# Patient Record
Sex: FEMALE | Race: BLACK OR AFRICAN AMERICAN | NOT HISPANIC OR LATINO | Employment: FULL TIME | URBAN - METROPOLITAN AREA
[De-identification: names, ages, dates, MRNs, and addresses within clinical notes are randomized per-mention and may not be internally consistent; named-entity substitution may affect disease eponyms.]

---

## 2023-03-31 PROBLEM — S13.9XXA CERVICAL SPRAIN: Status: ACTIVE | Noted: 2023-03-31

## 2023-03-31 PROBLEM — N76.1 CHRONIC VAGINITIS: Status: ACTIVE | Noted: 2023-03-31

## 2023-03-31 PROBLEM — R92.8 ABNORMAL MAMMOGRAM: Status: ACTIVE | Noted: 2023-03-31

## 2023-03-31 PROBLEM — N81.89 PELVIC FLOOR WEAKNESS: Status: ACTIVE | Noted: 2023-03-31

## 2023-03-31 PROBLEM — G44.309 POST-TRAUMATIC HEADACHE: Status: ACTIVE | Noted: 2023-03-31

## 2023-03-31 PROBLEM — R20.2 RIGHT HAND PARESTHESIA: Status: ACTIVE | Noted: 2023-03-31

## 2023-03-31 PROBLEM — N95.1 PERIMENOPAUSAL: Status: ACTIVE | Noted: 2023-03-31

## 2023-03-31 PROBLEM — N39.3 FEMALE STRESS INCONTINENCE: Status: ACTIVE | Noted: 2023-03-31

## 2023-03-31 PROBLEM — N95.0 POSTMENOPAUSAL BLEEDING: Status: ACTIVE | Noted: 2023-03-31

## 2023-03-31 PROBLEM — E78.00 HYPERCHOLESTEROLEMIA: Status: ACTIVE | Noted: 2023-03-31

## 2023-03-31 PROBLEM — E55.9 VITAMIN D DEFICIENCY: Status: ACTIVE | Noted: 2023-03-31

## 2023-03-31 PROBLEM — B37.31 YEAST INFECTION OF THE VAGINA: Status: ACTIVE | Noted: 2023-03-31

## 2023-03-31 PROBLEM — M54.9 BACK PAIN: Status: ACTIVE | Noted: 2023-03-31

## 2023-03-31 PROBLEM — F43.20 ADJUSTMENT DISORDER: Status: ACTIVE | Noted: 2023-03-31

## 2023-03-31 PROBLEM — R51.9 HEADACHE: Status: ACTIVE | Noted: 2023-03-31

## 2023-03-31 PROBLEM — L65.9 ALOPECIA: Status: ACTIVE | Noted: 2023-03-31

## 2023-03-31 PROBLEM — N92.6 IRREGULAR MENSTRUAL BLEEDING: Status: ACTIVE | Noted: 2023-03-31
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
## 2023-03-31 PROBLEM — D64.9 ANEMIA: Status: ACTIVE | Noted: 2023-03-31

## 2023-03-31 PROBLEM — I73.9 CLAUDICATION OF UPPER EXTREMITY (CMS-HCC): Status: ACTIVE | Noted: 2023-03-31

## 2023-03-31 PROBLEM — M79.601 PAIN OF RIGHT UPPER EXTREMITY: Status: ACTIVE | Noted: 2023-03-31

## 2023-03-31 PROBLEM — G47.00 INSOMNIA: Status: ACTIVE | Noted: 2023-03-31

## 2023-03-31 PROBLEM — B96.89 BACTERIAL VAGINOSIS: Status: ACTIVE | Noted: 2023-03-31

## 2023-03-31 PROBLEM — E11.9 TYPE 2 DIABETES MELLITUS (MULTI): Status: ACTIVE | Noted: 2023-03-31

## 2023-03-31 PROBLEM — E11.9 DIABETES MELLITUS (MULTI): Status: ACTIVE | Noted: 2023-03-31

## 2023-03-31 PROBLEM — M54.2 NECK PAIN: Status: ACTIVE | Noted: 2023-03-31

## 2023-03-31 PROBLEM — E86.0 DEHYDRATION: Status: ACTIVE | Noted: 2023-03-31

## 2023-03-31 PROBLEM — N76.0 BACTERIAL VAGINOSIS: Status: ACTIVE | Noted: 2023-03-31

## 2023-03-31 PROBLEM — I10 HYPERTENSION: Status: ACTIVE | Noted: 2023-03-31

## 2023-03-31 PROBLEM — N39.41 URGE INCONTINENCE OF URINE: Status: ACTIVE | Noted: 2023-03-31

## 2023-03-31 RX ORDER — CLOTRIMAZOLE AND BETAMETHASONE DIPROPIONATE 10; .64 MG/G; MG/G
CREAM TOPICAL 2 TIMES DAILY
COMMUNITY
Start: 2021-11-02

## 2023-04-03 ENCOUNTER — OFFICE VISIT (OUTPATIENT)
Dept: PRIMARY CARE | Facility: CLINIC | Age: 58
End: 2023-04-03
Payer: COMMERCIAL

## 2023-04-03 VITALS
HEART RATE: 70 BPM | BODY MASS INDEX: 33.31 KG/M2 | DIASTOLIC BLOOD PRESSURE: 89 MMHG | TEMPERATURE: 97.2 F | SYSTOLIC BLOOD PRESSURE: 160 MMHG | WEIGHT: 188 LBS | HEIGHT: 63 IN

## 2023-04-03 DIAGNOSIS — M79.89 LEG SWELLING: ICD-10-CM

## 2023-04-03 DIAGNOSIS — E11.9 TYPE 2 DIABETES MELLITUS WITHOUT COMPLICATION, UNSPECIFIED WHETHER LONG TERM INSULIN USE (MULTI): Primary | ICD-10-CM

## 2023-04-03 DIAGNOSIS — M25.512 ACUTE PAIN OF LEFT SHOULDER: ICD-10-CM

## 2023-04-03 LAB
HBA1C MFR BLD: >14 % (ref 4.2–6.5)
POC FINGERSTICK BLOOD GLUCOSE: 339 MG/DL (ref 70–100)

## 2023-04-03 PROCEDURE — 80061 LIPID PANEL: CPT

## 2023-04-03 PROCEDURE — 85025 COMPLETE CBC W/AUTO DIFF WBC: CPT

## 2023-04-03 PROCEDURE — 3046F HEMOGLOBIN A1C LEVEL >9.0%: CPT | Performed by: FAMILY MEDICINE

## 2023-04-03 PROCEDURE — 80053 COMPREHEN METABOLIC PANEL: CPT

## 2023-04-03 PROCEDURE — 84443 ASSAY THYROID STIM HORMONE: CPT

## 2023-04-03 PROCEDURE — 82962 GLUCOSE BLOOD TEST: CPT | Performed by: FAMILY MEDICINE

## 2023-04-03 PROCEDURE — 1036F TOBACCO NON-USER: CPT | Performed by: FAMILY MEDICINE

## 2023-04-03 PROCEDURE — 3077F SYST BP >= 140 MM HG: CPT | Performed by: FAMILY MEDICINE

## 2023-04-03 PROCEDURE — 3079F DIAST BP 80-89 MM HG: CPT | Performed by: FAMILY MEDICINE

## 2023-04-03 PROCEDURE — 36415 COLL VENOUS BLD VENIPUNCTURE: CPT | Performed by: FAMILY MEDICINE

## 2023-04-03 PROCEDURE — 83036 HEMOGLOBIN GLYCOSYLATED A1C: CPT | Performed by: FAMILY MEDICINE

## 2023-04-03 PROCEDURE — 99215 OFFICE O/P EST HI 40 MIN: CPT | Performed by: FAMILY MEDICINE

## 2023-04-03 RX ORDER — FLASH GLUCOSE SCANNING READER
EACH MISCELLANEOUS
Qty: 1 EACH | Refills: 0 | Status: SHIPPED | OUTPATIENT
Start: 2023-04-03 | End: 2023-04-25 | Stop reason: SDUPTHER

## 2023-04-03 RX ORDER — METFORMIN HYDROCHLORIDE 500 MG/1
500 TABLET ORAL
Qty: 60 TABLET | Refills: 11 | Status: SHIPPED | OUTPATIENT
Start: 2023-04-03 | End: 2023-04-04 | Stop reason: SDUPTHER

## 2023-04-03 RX ORDER — INSULIN GLARGINE 100 [IU]/ML
20 INJECTION, SOLUTION SUBCUTANEOUS EVERY MORNING
Status: SHIPPED | OUTPATIENT
Start: 2023-04-04

## 2023-04-03 RX ORDER — FLASH GLUCOSE SENSOR
KIT MISCELLANEOUS
Qty: 1 EACH | Refills: 0 | Status: SHIPPED | OUTPATIENT
Start: 2023-04-03 | End: 2023-04-25 | Stop reason: SDUPTHER

## 2023-04-03 NOTE — PROGRESS NOTES
This is a 58-year-old female who is seeing me for the first time    She came complaining of left shoulder pain and right leg swelling    I reviewed her old chart and there was a diagnosis of diabetes but in her intake sheet she never mentioned anything about diabetes    And when I checked her blood sugar fingerstick it was in the 300s    Therefore I ordered C-peptide among other routine lab work    By the way she never had a lipid panel since 2016    Also she has never seen a ophthalmologist for her diabetes    I tried my best to educate on diabetes and prevention of complications    I started her on Basaglar 20 units and also metformin    Referred her to the endocrinologist    Some years ago she has had the sleep procedure for weight management    I informed her she needs to give up drinking pop and beer and get into the rhythm of changing her lifestyle    For her right leg swelling I ordered a stat duplex venous scan    And for her left shoulder pain referred her to the shoulder orthopedic    Also I referred her to the ophthalmologist    Wrote her a prescription for freestyle susan the glucose meter    Advised her to check her sugars and write it down and see me back tomorrow    Also it showed her how to use the Basaglar pen and advised her to  a prescription for metformin twice a day from the pharmacy

## 2023-04-03 NOTE — PATIENT INSTRUCTIONS
1.  Every day sleep  at night or rest ( some days we are unable to sleep )around the same time without the TV-this is important habit to reduce dementia and aging prematurely    2.  Eat 3 cups of green leafy vegetables daily include at least 1 fruit.,  Reduce animal protein consumption-/ also  Starch  consumption  --this will help to lose weight avoid illnesses such as dementia high blood pressure cancer.,  Diabetes    3.  Exercise including aerobics as well as resistant exercise for at least 45 minutes a day for 5 days this will also help to reduce premature aging     4.  Your sugar is extremely high as you hurt it in the 300 normally the fasting blood sugar should be around 100 therefore I have to start you on the long-acting insulin call the Basaglar for which I will give you samples from the office you have to take 20 units daily and I will show you how to do that along with that is metformin 500 twice a day and please follow the 3 objectives that I have relayed that is changing lifestyle  Please do not touch pop or drink beer    And see me tomorrow    I have ordered the freestyle susan which is system to check your sugar without pricking your fingers and I hope this will be covered on your insurance if not let us know    I also have referred you to the endocrinologist      The reason you were here today is complaining of right leg pain for which I ordered the ultrasound of your right leg to rule out clot that can break away and go to the lung so therefore you need to have the ultrasound done today regarding the left shoulder pain I have placed a referral to see the orthopedic doctor    Today before you leave we will also draw your blood work to check your cholesterol check your kidneys liver thyroid test    As you know uncontrolled diabetes can lead you to have strokes heart attacks diabetic coma blindness kidney failure damage to your nerves that can lead you to have burning pain in your feet.,  Cut off your  blood supply to your legs arms that will result in gangrene of your arms or legs so we need to take charge and control of your sugar and the only person who can do is you with our guidance    I will also place a referral for you to see the eye doctor because eyesight can get infected and lead to blindness    So that will be 4 referrals today

## 2023-04-04 ENCOUNTER — APPOINTMENT (OUTPATIENT)
Dept: PRIMARY CARE | Facility: CLINIC | Age: 58
End: 2023-04-04
Payer: COMMERCIAL

## 2023-04-04 DIAGNOSIS — E11.9 TYPE 2 DIABETES MELLITUS WITHOUT COMPLICATION, UNSPECIFIED WHETHER LONG TERM INSULIN USE (MULTI): ICD-10-CM

## 2023-04-04 LAB
ALANINE AMINOTRANSFERASE (SGPT) (U/L) IN SER/PLAS: 40 U/L (ref 7–45)
ALBUMIN (G/DL) IN SER/PLAS: 3.8 G/DL (ref 3.4–5)
ALKALINE PHOSPHATASE (U/L) IN SER/PLAS: 116 U/L (ref 33–110)
ANION GAP IN SER/PLAS: 14 MMOL/L (ref 10–20)
ASPARTATE AMINOTRANSFERASE (SGOT) (U/L) IN SER/PLAS: 20 U/L (ref 9–39)
BASOPHILS (10*3/UL) IN BLOOD BY AUTOMATED COUNT: 0.02 X10E9/L (ref 0–0.1)
BASOPHILS/100 LEUKOCYTES IN BLOOD BY AUTOMATED COUNT: 0.4 % (ref 0–2)
BILIRUBIN TOTAL (MG/DL) IN SER/PLAS: 0.6 MG/DL (ref 0–1.2)
CALCIUM (MG/DL) IN SER/PLAS: 9 MG/DL (ref 8.6–10.6)
CARBON DIOXIDE, TOTAL (MMOL/L) IN SER/PLAS: 28 MMOL/L (ref 21–32)
CHLORIDE (MMOL/L) IN SER/PLAS: 100 MMOL/L (ref 98–107)
CHOLESTEROL (MG/DL) IN SER/PLAS: 227 MG/DL (ref 0–199)
CHOLESTEROL IN HDL (MG/DL) IN SER/PLAS: 45.3 MG/DL
CHOLESTEROL/HDL RATIO: 5
CREATININE (MG/DL) IN SER/PLAS: 0.81 MG/DL (ref 0.5–1.05)
EOSINOPHILS (10*3/UL) IN BLOOD BY AUTOMATED COUNT: 0.13 X10E9/L (ref 0–0.7)
EOSINOPHILS/100 LEUKOCYTES IN BLOOD BY AUTOMATED COUNT: 2.7 % (ref 0–6)
ERYTHROCYTE DISTRIBUTION WIDTH (RATIO) BY AUTOMATED COUNT: 13.8 % (ref 11.5–14.5)
ERYTHROCYTE MEAN CORPUSCULAR HEMOGLOBIN CONCENTRATION (G/DL) BY AUTOMATED: 31.5 G/DL (ref 32–36)
ERYTHROCYTE MEAN CORPUSCULAR VOLUME (FL) BY AUTOMATED COUNT: 85 FL (ref 80–100)
ERYTHROCYTES (10*6/UL) IN BLOOD BY AUTOMATED COUNT: 4.03 X10E12/L (ref 4–5.2)
GFR FEMALE: 85 ML/MIN/1.73M2
GLUCOSE (MG/DL) IN SER/PLAS: 364 MG/DL (ref 74–99)
HEMATOCRIT (%) IN BLOOD BY AUTOMATED COUNT: 34.3 % (ref 36–46)
HEMOGLOBIN (G/DL) IN BLOOD: 10.8 G/DL (ref 12–16)
IMMATURE GRANULOCYTES/100 LEUKOCYTES IN BLOOD BY AUTOMATED COUNT: 0.2 % (ref 0–0.9)
LDL: 132 MG/DL (ref 0–99)
LEUKOCYTES (10*3/UL) IN BLOOD BY AUTOMATED COUNT: 4.8 X10E9/L (ref 4.4–11.3)
LYMPHOCYTES (10*3/UL) IN BLOOD BY AUTOMATED COUNT: 1.51 X10E9/L (ref 1.2–4.8)
LYMPHOCYTES/100 LEUKOCYTES IN BLOOD BY AUTOMATED COUNT: 31.2 % (ref 13–44)
MONOCYTES (10*3/UL) IN BLOOD BY AUTOMATED COUNT: 0.28 X10E9/L (ref 0.1–1)
MONOCYTES/100 LEUKOCYTES IN BLOOD BY AUTOMATED COUNT: 5.8 % (ref 2–10)
NEUTROPHILS (10*3/UL) IN BLOOD BY AUTOMATED COUNT: 2.89 X10E9/L (ref 1.2–7.7)
NEUTROPHILS/100 LEUKOCYTES IN BLOOD BY AUTOMATED COUNT: 59.7 % (ref 40–80)
NON HDL CHOLESTEROL: 182 MG/DL
NRBC (PER 100 WBCS) BY AUTOMATED COUNT: 0 /100 WBC (ref 0–0)
PLATELETS (10*3/UL) IN BLOOD AUTOMATED COUNT: 172 X10E9/L (ref 150–450)
POTASSIUM (MMOL/L) IN SER/PLAS: 3.6 MMOL/L (ref 3.5–5.3)
PROTEIN TOTAL: 6.8 G/DL (ref 6.4–8.2)
SODIUM (MMOL/L) IN SER/PLAS: 138 MMOL/L (ref 136–145)
THYROTROPIN (MIU/L) IN SER/PLAS BY DETECTION LIMIT <= 0.05 MIU/L: 1.24 MIU/L (ref 0.44–3.98)
TRIGLYCERIDE (MG/DL) IN SER/PLAS: 251 MG/DL (ref 0–149)
UREA NITROGEN (MG/DL) IN SER/PLAS: 6 MG/DL (ref 6–23)
VLDL: 50 MG/DL (ref 0–40)

## 2023-04-04 RX ORDER — METFORMIN HYDROCHLORIDE 500 MG/1
500 TABLET ORAL
Qty: 180 TABLET | Refills: 1 | Status: SHIPPED | OUTPATIENT
Start: 2023-04-04 | End: 2023-04-06

## 2023-04-06 ENCOUNTER — OFFICE VISIT (OUTPATIENT)
Dept: PRIMARY CARE | Facility: CLINIC | Age: 58
End: 2023-04-06
Payer: COMMERCIAL

## 2023-04-06 VITALS
HEIGHT: 63 IN | HEART RATE: 79 BPM | TEMPERATURE: 97.2 F | BODY MASS INDEX: 32.96 KG/M2 | SYSTOLIC BLOOD PRESSURE: 154 MMHG | DIASTOLIC BLOOD PRESSURE: 80 MMHG | WEIGHT: 186 LBS

## 2023-04-06 DIAGNOSIS — Z12.39 ENCOUNTER FOR SCREENING BREAST EXAMINATION: ICD-10-CM

## 2023-04-06 DIAGNOSIS — Z12.11 ENCOUNTER FOR SCREENING FOR MALIGNANT NEOPLASM OF COLON: ICD-10-CM

## 2023-04-06 DIAGNOSIS — I10 HYPERTENSION, UNSPECIFIED TYPE: ICD-10-CM

## 2023-04-06 DIAGNOSIS — E78.9 LIPID DISORDER: ICD-10-CM

## 2023-04-06 DIAGNOSIS — E11.65 TYPE 2 DIABETES MELLITUS WITH HYPERGLYCEMIA, UNSPECIFIED WHETHER LONG TERM INSULIN USE (MULTI): ICD-10-CM

## 2023-04-06 DIAGNOSIS — E11.65 TYPE 2 DIABETES MELLITUS WITH HYPERGLYCEMIA, UNSPECIFIED WHETHER LONG TERM INSULIN USE (MULTI): Primary | ICD-10-CM

## 2023-04-06 DIAGNOSIS — Z63.4 BEREAVEMENT: ICD-10-CM

## 2023-04-06 PROCEDURE — 3046F HEMOGLOBIN A1C LEVEL >9.0%: CPT | Performed by: FAMILY MEDICINE

## 2023-04-06 PROCEDURE — 3079F DIAST BP 80-89 MM HG: CPT | Performed by: FAMILY MEDICINE

## 2023-04-06 PROCEDURE — 4010F ACE/ARB THERAPY RXD/TAKEN: CPT | Performed by: FAMILY MEDICINE

## 2023-04-06 PROCEDURE — 3077F SYST BP >= 140 MM HG: CPT | Performed by: FAMILY MEDICINE

## 2023-04-06 PROCEDURE — 1036F TOBACCO NON-USER: CPT | Performed by: FAMILY MEDICINE

## 2023-04-06 PROCEDURE — 99214 OFFICE O/P EST MOD 30 MIN: CPT | Performed by: FAMILY MEDICINE

## 2023-04-06 RX ORDER — INSULIN GLARGINE 100 [IU]/ML
20 INJECTION, SOLUTION SUBCUTANEOUS NIGHTLY
Qty: 3 ML | Refills: 12 | Status: SHIPPED | OUTPATIENT
Start: 2023-04-06 | End: 2023-07-06 | Stop reason: WASHOUT

## 2023-04-06 RX ORDER — ATORVASTATIN CALCIUM 20 MG/1
20 TABLET, FILM COATED ORAL DAILY
Qty: 90 TABLET | Refills: 1 | Status: SHIPPED | OUTPATIENT
Start: 2023-04-06 | End: 2024-01-29 | Stop reason: ALTCHOICE

## 2023-04-06 RX ORDER — METFORMIN HYDROCHLORIDE 1000 MG/1
1000 TABLET ORAL
Qty: 180 TABLET | Refills: 1 | Status: SHIPPED | OUTPATIENT
Start: 2023-04-06 | End: 2023-07-06 | Stop reason: WASHOUT

## 2023-04-06 RX ORDER — METFORMIN HYDROCHLORIDE 1000 MG/1
1000 TABLET ORAL
Qty: 60 TABLET | Refills: 11 | Status: SHIPPED | OUTPATIENT
Start: 2023-04-06 | End: 2023-04-06 | Stop reason: SDUPTHER

## 2023-04-06 RX ORDER — ATORVASTATIN CALCIUM 20 MG/1
20 TABLET, FILM COATED ORAL DAILY
Qty: 30 TABLET | Refills: 5 | Status: SHIPPED | OUTPATIENT
Start: 2023-04-06 | End: 2023-04-06 | Stop reason: SDUPTHER

## 2023-04-06 RX ORDER — LOSARTAN POTASSIUM 50 MG/1
50 TABLET ORAL DAILY
Qty: 90 TABLET | Refills: 1 | Status: SHIPPED | OUTPATIENT
Start: 2023-04-06 | End: 2023-07-06 | Stop reason: SDUPTHER

## 2023-04-06 RX ORDER — LOSARTAN POTASSIUM 50 MG/1
50 TABLET ORAL DAILY
Qty: 30 TABLET | Refills: 11 | Status: SHIPPED | OUTPATIENT
Start: 2023-04-06 | End: 2023-04-06 | Stop reason: SDUPTHER

## 2023-04-06 SDOH — SOCIAL STABILITY - SOCIAL INSECURITY: DISSAPEARANCE AND DEATH OF FAMILY MEMBER: Z63.4

## 2023-04-06 NOTE — PATIENT INSTRUCTIONS
Since your blood sugar is still high I would like you to take 1000 mg of metformin twice a day okay please listen.  Metformin is a great medicine okay to help you to lose weight and also it reduces the aging process    Your cholesterol the bad cholesterol was 136 and also your liver count was high that shows that you are collecting fat in the liver for which I will order ultrasound of the liver and the gallbladder and start you on cholesterol medicine as well    All these medicines that we are speaking of currently will come off on his once I see that the sugar is getting better and you are losing weight    Are you following the 3 rules that I asked you to do    I hear that you are still working on the sleep    3 cups of green leafy vegetables could be anything that is leafy and green that can be kale broccoli and asparagus spinach Cabbage--all what I am asking you to do is measure 3 cups of green leafy vegetable it can be salad leaves anything that you come across and I wanted to make sure you eat 3 cups of green leafy vegetable no more than 1 fruit a day and ideally it should be tart food fruit rather than the sweet like a green apple.,  You can have animal protein but to a lesser degree     kim and sausage eggs cheese makes cholesterol in body so we will reduce that      Now lets talk about exercise--- please start counting your steps 10,000 a day should be your goal, and make sure that you had on to the gym at least 5 days a week that you will get on any of the machines and also start doing resistant exercise this will be the best medicine to fight to emotions, lose weight, and get rid of diabetes forever    So continue with the Basaglar at the same dose but I do not want  increase the insulin because it will put on more weight on you therefore increase the metformin to thousand twice a day        The other medicine that I have to start you on is to lower the blood pressure okay and it will be losartan  lower your cholesterol it will be Lipitor and increase the metformin to thousand twice a day continue the Basaglar at 20 units    But the best medicine is to work on your weight and the 3 cups of green leafy vegetables and if you diligently do it discipline yourself to do it the exercise the nutrition you will come off of all the medicine    Today also I am going to refer you to the psychologist    Since your red blood count was low I am also going to get a colonoscopy done but after age 50 it is a routine for you to have a colonoscopy done

## 2023-04-06 NOTE — PROGRESS NOTES
This is a 58-year-old female patient who is here for follow-up for diabetes    Again we had a long chat    I found out that her oldest child 32-year-old girl  2021    She is still trying to recover from it    She has 3 sons 1 living in [place] the child in [place] the other 1 in [place]        I feel managing her medical problems is hindered by her emotional situation losing her daughter    I have referred her to bereavement counseling    I will increase the metformin to thousand twice a day    Also continue the Basaglar at 20 units    Advised her to check her sugars before each meal she has got the freestyle susan    Started on losartan for her blood pressure and Lipitor for her cholesterol    Also referred her to get the colonoscopy and mammogram    Advised her to come back in 2 weeks with her sugar values    Swelling of her leg is better and her left shoulder pain is better

## 2023-04-12 DIAGNOSIS — E11.65 TYPE 2 DIABETES MELLITUS WITH HYPERGLYCEMIA, UNSPECIFIED WHETHER LONG TERM INSULIN USE (MULTI): Primary | ICD-10-CM

## 2023-04-12 RX ORDER — PEN NEEDLE, DIABETIC 29 G X1/2"
NEEDLE, DISPOSABLE MISCELLANEOUS
Qty: 100 EACH | Refills: 11 | Status: SHIPPED | OUTPATIENT
Start: 2023-04-12 | End: 2023-04-13 | Stop reason: SDUPTHER

## 2023-04-13 DIAGNOSIS — E11.65 TYPE 2 DIABETES MELLITUS WITH HYPERGLYCEMIA, UNSPECIFIED WHETHER LONG TERM INSULIN USE (MULTI): ICD-10-CM

## 2023-04-13 RX ORDER — PEN NEEDLE, DIABETIC 29 G X1/2"
NEEDLE, DISPOSABLE MISCELLANEOUS
Qty: 360 EACH | Refills: 1 | Status: SHIPPED | OUTPATIENT
Start: 2023-04-13 | End: 2024-01-29 | Stop reason: ALTCHOICE

## 2023-04-25 ENCOUNTER — OFFICE VISIT (OUTPATIENT)
Dept: PRIMARY CARE | Facility: CLINIC | Age: 58
End: 2023-04-25
Payer: COMMERCIAL

## 2023-04-25 ENCOUNTER — APPOINTMENT (OUTPATIENT)
Dept: PRIMARY CARE | Facility: CLINIC | Age: 58
End: 2023-04-25
Payer: COMMERCIAL

## 2023-04-25 VITALS
SYSTOLIC BLOOD PRESSURE: 116 MMHG | DIASTOLIC BLOOD PRESSURE: 69 MMHG | BODY MASS INDEX: 31.18 KG/M2 | TEMPERATURE: 97.2 F | HEIGHT: 63 IN | WEIGHT: 176 LBS | HEART RATE: 84 BPM

## 2023-04-25 DIAGNOSIS — E11.9 TYPE 2 DIABETES MELLITUS WITHOUT COMPLICATION, UNSPECIFIED WHETHER LONG TERM INSULIN USE (MULTI): ICD-10-CM

## 2023-04-25 DIAGNOSIS — E78.9 LIPID DISORDER: Primary | ICD-10-CM

## 2023-04-25 DIAGNOSIS — I10 HYPERTENSION, UNSPECIFIED TYPE: ICD-10-CM

## 2023-04-25 LAB — POC FINGERSTICK BLOOD GLUCOSE: 159 MG/DL (ref 70–100)

## 2023-04-25 PROCEDURE — 3074F SYST BP LT 130 MM HG: CPT | Performed by: FAMILY MEDICINE

## 2023-04-25 PROCEDURE — 1036F TOBACCO NON-USER: CPT | Performed by: FAMILY MEDICINE

## 2023-04-25 PROCEDURE — 3078F DIAST BP <80 MM HG: CPT | Performed by: FAMILY MEDICINE

## 2023-04-25 PROCEDURE — 82962 GLUCOSE BLOOD TEST: CPT | Performed by: FAMILY MEDICINE

## 2023-04-25 PROCEDURE — 99214 OFFICE O/P EST MOD 30 MIN: CPT | Performed by: FAMILY MEDICINE

## 2023-04-25 PROCEDURE — 4010F ACE/ARB THERAPY RXD/TAKEN: CPT | Performed by: FAMILY MEDICINE

## 2023-04-25 PROCEDURE — 3046F HEMOGLOBIN A1C LEVEL >9.0%: CPT | Performed by: FAMILY MEDICINE

## 2023-04-25 RX ORDER — FLASH GLUCOSE SENSOR
KIT MISCELLANEOUS
Qty: 1 EACH | Refills: 0 | Status: SHIPPED | OUTPATIENT
Start: 2023-04-25 | End: 2024-01-29 | Stop reason: ALTCHOICE

## 2023-04-25 RX ORDER — FLASH GLUCOSE SCANNING READER
EACH MISCELLANEOUS
Qty: 1 EACH | Refills: 0 | Status: SHIPPED | OUTPATIENT
Start: 2023-04-25 | End: 2024-01-29 | Stop reason: ALTCHOICE

## 2023-04-25 NOTE — PATIENT INSTRUCTIONS
Metformin does the same function inside the cell as if you are exercising and eating less metformin is found in a few research has shown that metformin does slows down the aging process     Ctn same dose of Metformin twice a day but continue to check your sugar continue to eat 3 cups of green leafy vegetables please take 10,000 steps daily    Losartan that is given for the blood pressure is also protecting you from having kidney damage due to diabetes    You need to check your sugar every day before you eat or drink I will prescribe the freestyle susan the sensor has to be changed every 14 days    Since your blood pressure is good I will reduce the losartan to 25 okay    Follow-up in 1 month

## 2023-04-25 NOTE — PROGRESS NOTES
This is a 58-year-old female patient who is here for her follow-up for diabetes hypertension hyperlipidemia    She is having side effects with metformin she feels nauseous and feel faint .    I wanted to cut back on the metformin but her sugar was 150 she was postprandial over 4 hours will reduce the losartan to 25        She does not use any insulin and she has not got the freestyle susan sensors yet but she told me that she will go to the pharmacy immediately and check on that    It looks like she might not need any insulin but advised her to continue to check her sugar daily since her numbers are getting better I will ask her to come back in 3 months for her annual physical      She was asked to come back in 1 month for follow-up on diabetes

## 2023-05-26 ENCOUNTER — APPOINTMENT (OUTPATIENT)
Dept: PRIMARY CARE | Facility: CLINIC | Age: 58
End: 2023-05-26
Payer: COMMERCIAL

## 2023-07-03 DIAGNOSIS — E78.9 LIPID DISORDER: ICD-10-CM

## 2023-07-03 RX ORDER — ATORVASTATIN CALCIUM 20 MG/1
20 TABLET, FILM COATED ORAL DAILY
Qty: 90 TABLET | Refills: 1 | OUTPATIENT
Start: 2023-07-03 | End: 2023-12-30

## 2023-07-03 NOTE — TELEPHONE ENCOUNTER
She needs appointment.  When I saw her on April 25 advised her to come back in 1 month I do not see any future appointments I have to see her before I authorize the cholesterol medicine    I also have to check her diabetes status

## 2023-07-05 ENCOUNTER — APPOINTMENT (OUTPATIENT)
Dept: PRIMARY CARE | Facility: CLINIC | Age: 58
End: 2023-07-05
Payer: COMMERCIAL

## 2023-07-06 ENCOUNTER — OFFICE VISIT (OUTPATIENT)
Dept: PRIMARY CARE | Facility: CLINIC | Age: 58
End: 2023-07-06
Payer: COMMERCIAL

## 2023-07-06 VITALS
WEIGHT: 175 LBS | HEART RATE: 101 BPM | DIASTOLIC BLOOD PRESSURE: 69 MMHG | SYSTOLIC BLOOD PRESSURE: 110 MMHG | BODY MASS INDEX: 31.01 KG/M2 | TEMPERATURE: 97 F | HEIGHT: 63 IN

## 2023-07-06 DIAGNOSIS — E55.9 VITAMIN D DEFICIENCY: ICD-10-CM

## 2023-07-06 DIAGNOSIS — E11.9 TYPE 2 DIABETES MELLITUS WITHOUT COMPLICATION, UNSPECIFIED WHETHER LONG TERM INSULIN USE (MULTI): Primary | ICD-10-CM

## 2023-07-06 DIAGNOSIS — R74.8 ELEVATED ALKALINE PHOSPHATASE LEVEL: ICD-10-CM

## 2023-07-06 DIAGNOSIS — R79.89 ELEVATED LIVER FUNCTION TESTS: ICD-10-CM

## 2023-07-06 DIAGNOSIS — I10 HYPERTENSION, UNSPECIFIED TYPE: ICD-10-CM

## 2023-07-06 LAB
ALANINE AMINOTRANSFERASE (SGPT) (U/L) IN SER/PLAS: 31 U/L (ref 7–45)
ALBUMIN (G/DL) IN SER/PLAS: 4.3 G/DL (ref 3.4–5)
ALKALINE PHOSPHATASE (U/L) IN SER/PLAS: 132 U/L (ref 33–110)
ASPARTATE AMINOTRANSFERASE (SGOT) (U/L) IN SER/PLAS: 23 U/L (ref 9–39)
BILIRUBIN DIRECT (MG/DL) IN SER/PLAS: 0.1 MG/DL (ref 0–0.3)
BILIRUBIN TOTAL (MG/DL) IN SER/PLAS: 0.6 MG/DL (ref 0–1.2)
PROTEIN TOTAL: 7.8 G/DL (ref 6.4–8.2)

## 2023-07-06 PROCEDURE — 3074F SYST BP LT 130 MM HG: CPT | Performed by: FAMILY MEDICINE

## 2023-07-06 PROCEDURE — 3078F DIAST BP <80 MM HG: CPT | Performed by: FAMILY MEDICINE

## 2023-07-06 PROCEDURE — 1036F TOBACCO NON-USER: CPT | Performed by: FAMILY MEDICINE

## 2023-07-06 PROCEDURE — 84681 ASSAY OF C-PEPTIDE: CPT

## 2023-07-06 PROCEDURE — 80076 HEPATIC FUNCTION PANEL: CPT

## 2023-07-06 PROCEDURE — 99214 OFFICE O/P EST MOD 30 MIN: CPT | Performed by: FAMILY MEDICINE

## 2023-07-06 PROCEDURE — 3046F HEMOGLOBIN A1C LEVEL >9.0%: CPT | Performed by: FAMILY MEDICINE

## 2023-07-06 PROCEDURE — 4010F ACE/ARB THERAPY RXD/TAKEN: CPT | Performed by: FAMILY MEDICINE

## 2023-07-06 RX ORDER — METFORMIN HYDROCHLORIDE 500 MG/1
2000 TABLET, EXTENDED RELEASE ORAL
Qty: 120 TABLET | Refills: 11 | Status: SHIPPED | OUTPATIENT
Start: 2023-07-06 | End: 2024-01-29 | Stop reason: ALTCHOICE

## 2023-07-06 RX ORDER — LOSARTAN POTASSIUM 50 MG/1
50 TABLET ORAL DAILY
Qty: 90 TABLET | Refills: 1 | Status: SHIPPED | OUTPATIENT
Start: 2023-07-06 | End: 2024-01-29 | Stop reason: ALTCHOICE

## 2023-07-06 RX ORDER — TIRZEPATIDE 5 MG/.5ML
INJECTION, SOLUTION SUBCUTANEOUS
COMMUNITY
End: 2023-10-25 | Stop reason: ALTCHOICE

## 2023-07-06 NOTE — PROGRESS NOTES
This is a 58-year-old female who has diabetes hyperlipidemia hypertension    She is here for follow-up    She is seen the endocrinologist and is on Mounjaro will be moving up to 5 mg    Since she still has a problem tolerating the metformin regular strength I have prescribed metformin  mg up to 4 tablets a day advised her to take it with the Mounjaro she is off insulin    I informed one of the liver function test the alkaline phosphatase was high and I am suspecting fatty liver I will repeat a hepatic panel before I renew her Lipitor prescription    I will check a C-peptide today    Advised her to come back in 3 months    I encouraged her to walk and lose weight continue losartan

## 2023-07-06 NOTE — PATIENT INSTRUCTIONS
1.  Regarding diabetes you are on Mounjaro it will help you to get your appetite down and make you lose weight it is not an insulin but it is a promoter of insulin    You continue 2.5 mg for tomorrow and then increase it to 5 mg you will feel full and will not have a great appetite which will help you to lose weight and get your sugar under control    2.  Also I changed your metformin from regular 1000 that has a bad taste and difficult to tolerate to metformin extended release 500 mg and you need to take up to 4 tablets and that tinea then the regular metformin and it is easier to tolerate and hopefully it is covered on your insurance    3.  Please continue to walk more you walk better and that will be for life    4.  Your liver function test was high that is alkaline phosphatase that is the name of the liver function test that was high and that can be due to too much fat in the liver.  When I checked your blood in April your fat in the liver was very high and also the cholesterol was high I will check your liver function test as well today    I will wait for the liver function test to  renew the cholesterol medicine      Please come back in 3 months

## 2023-07-07 LAB — C PEPTIDE (NG/ML) IN SER/PLAS: 8.5 NG/ML (ref 0.7–3.9)

## 2023-10-25 ENCOUNTER — CLINICAL SUPPORT (OUTPATIENT)
Dept: ENDOCRINOLOGY | Facility: CLINIC | Age: 58
End: 2023-10-25
Payer: COMMERCIAL

## 2023-10-25 DIAGNOSIS — E11.649 TYPE 2 DIABETES MELLITUS WITH HYPOGLYCEMIA WITHOUT COMA, WITHOUT LONG-TERM CURRENT USE OF INSULIN (MULTI): Primary | ICD-10-CM

## 2023-10-25 LAB — POC HEMOGLOBIN A1C: 5.9 % (ref 4.2–6.5)

## 2023-10-25 PROCEDURE — 99211 OFF/OP EST MAY X REQ PHY/QHP: CPT | Performed by: PHARMACIST

## 2023-10-25 PROCEDURE — 83036 HEMOGLOBIN GLYCOSYLATED A1C: CPT | Performed by: PHARMACIST

## 2023-10-25 RX ORDER — TIRZEPATIDE 5 MG/.5ML
5 INJECTION, SOLUTION SUBCUTANEOUS
Qty: 2 ML | Refills: 11 | Status: SHIPPED | OUTPATIENT
Start: 2023-10-25

## 2023-10-25 NOTE — PROGRESS NOTES
Clinical Pharmacy Team met with Rere Emery regarding a consultation for diabetes management thanks to a referral from Shila Bolaños CNP. Below is a summary of our conversation and recommendations:    Recommendations:  Continue all DM medications as prescribed  2. Patient should restart using Elizabeth CGM to monitor glucose  ________________________________________________________________________      Allergies   Allergen Reactions    Penicillins Itching     Diabetes Pharmacotherapy:    Mounjaro 5 mg subcutaneous once every 10 days      Lab Review  Lab Results   Component Value Date    BILITOT 0.6 07/06/2023    CALCIUM 9.0 04/03/2023    CO2 28 04/03/2023     04/03/2023    CREATININE 0.81 04/03/2023    GLUCOSE 364 (H) 04/03/2023    ALKPHOS 132 (H) 07/06/2023    K 3.6 04/03/2023    PROT 7.8 07/06/2023     04/03/2023    AST 23 07/06/2023    ALT 31 07/06/2023    BUN 6 04/03/2023    ANIONGAP 14 04/03/2023    ALBUMIN 4.3 07/06/2023    GFRF 85 04/03/2023     Lab Results   Component Value Date    TRIG 251 (H) 04/03/2023    CHOL 227 (H) 04/03/2023    HDL 45.3 04/03/2023     Lab Results   Component Value Date    HGBA1C 5.9 10/25/2023    HGBA1C >14.0 (H) 04/03/2023     The 10-year ASCVD risk score (Consuelo DK, et al., 2019) is: 10.6%    Values used to calculate the score:      Age: 58 years      Sex: Female      Is Non- : Yes      Diabetic: Yes      Tobacco smoker: No      Systolic Blood Pressure: 110 mmHg      Is BP treated: Yes      HDL Cholesterol: 45.3 mg/dL      Total Cholesterol: 227 mg/dL      Monitoring     Patient was not able to wear a CGM in the past two weeks. She reports her mother recently passing away this month. Reports new diagnosis of cancer in August which progressed rapidly. She states she will start wearing her CGM. She denies experiencing symptoms associated with hypoglycemia.     Assessment/Plan     The patient reports today for a diabetes consultation. In office A1C  resulted with an A1C at goal. Previous CGM data suggested an estimated A1C at goal as well. Patient denies experiancing symptoms of hypoglycemia. At this time recommend no changes as she is meeting her glycemic goals. We discussed getting repeat yearly labs which she was agreeable to. She will follow up with endocrinology in January.     Of note her past lipid panel suggests values that may not be at goal. This may have been secondary to her uncontrolled DM at the time. Will order lipid panel to review at next endo visit.     Patient understands and was agreeable to these recommendations.     PATIENT EDUCATION/GOALS    Goals  Fasting B - 130 mg/dL  Postprandial BG: less than 180 mg/dL  A1c: less than 7%    Type of encounter: [ in person ]    Provided counseling on lifestyle modifications, medications, and self-monitoring. Patient has no additional questions at this time. Pharmacy to follow up as requested by care team. Please reach out with any questions. Thank you.       Maritza Castro, PharmD    Provider on site:  Shila Bolaños CNP  Continue all meds under the continuation of care with the referring provider and clinical pharmacy team.

## 2024-01-29 ENCOUNTER — LAB (OUTPATIENT)
Dept: LAB | Facility: LAB | Age: 59
End: 2024-01-29
Payer: COMMERCIAL

## 2024-01-29 ENCOUNTER — OFFICE VISIT (OUTPATIENT)
Dept: ENDOCRINOLOGY | Facility: CLINIC | Age: 59
End: 2024-01-29
Payer: COMMERCIAL

## 2024-01-29 VITALS
HEIGHT: 64 IN | HEART RATE: 73 BPM | BODY MASS INDEX: 26.98 KG/M2 | SYSTOLIC BLOOD PRESSURE: 120 MMHG | WEIGHT: 158 LBS | DIASTOLIC BLOOD PRESSURE: 73 MMHG

## 2024-01-29 DIAGNOSIS — E11.65 TYPE 2 DIABETES MELLITUS WITH HYPERGLYCEMIA, WITHOUT LONG-TERM CURRENT USE OF INSULIN (MULTI): Primary | ICD-10-CM

## 2024-01-29 DIAGNOSIS — E78.5 HYPERLIPIDEMIA, UNSPECIFIED HYPERLIPIDEMIA TYPE: ICD-10-CM

## 2024-01-29 DIAGNOSIS — E11.65 TYPE 2 DIABETES MELLITUS WITH HYPERGLYCEMIA, WITHOUT LONG-TERM CURRENT USE OF INSULIN (MULTI): ICD-10-CM

## 2024-01-29 LAB
ALBUMIN SERPL BCP-MCNC: 4.4 G/DL (ref 3.4–5)
ALP SERPL-CCNC: 66 U/L (ref 33–110)
ALT SERPL W P-5'-P-CCNC: 17 U/L (ref 7–45)
ANION GAP SERPL CALC-SCNC: 11 MMOL/L (ref 10–20)
AST SERPL W P-5'-P-CCNC: 17 U/L (ref 9–39)
BILIRUB SERPL-MCNC: 0.4 MG/DL (ref 0–1.2)
BUN SERPL-MCNC: 16 MG/DL (ref 6–23)
CALCIUM SERPL-MCNC: 10 MG/DL (ref 8.6–10.6)
CHLORIDE SERPL-SCNC: 103 MMOL/L (ref 98–107)
CHOLEST SERPL-MCNC: 222 MG/DL (ref 0–199)
CHOLESTEROL/HDL RATIO: 5.1
CO2 SERPL-SCNC: 30 MMOL/L (ref 21–32)
CREAT SERPL-MCNC: 1.05 MG/DL (ref 0.5–1.05)
CREAT UR-MCNC: 217.9 MG/DL (ref 20–320)
EGFRCR SERPLBLD CKD-EPI 2021: 62 ML/MIN/1.73M*2
GLUCOSE SERPL-MCNC: 90 MG/DL (ref 74–99)
HDLC SERPL-MCNC: 43.5 MG/DL
MICROALBUMIN UR-MCNC: 16.9 MG/L
MICROALBUMIN/CREAT UR: 7.8 UG/MG CREAT
NON-HDL CHOLESTEROL: 179 MG/DL (ref 0–149)
POC HEMOGLOBIN A1C: 6.3 % (ref 4.2–6.5)
POTASSIUM SERPL-SCNC: 3.9 MMOL/L (ref 3.5–5.3)
PROT SERPL-MCNC: 7.4 G/DL (ref 6.4–8.2)
SODIUM SERPL-SCNC: 140 MMOL/L (ref 136–145)

## 2024-01-29 PROCEDURE — 36415 COLL VENOUS BLD VENIPUNCTURE: CPT

## 2024-01-29 PROCEDURE — 83718 ASSAY OF LIPOPROTEIN: CPT

## 2024-01-29 PROCEDURE — 82570 ASSAY OF URINE CREATININE: CPT

## 2024-01-29 PROCEDURE — 99214 OFFICE O/P EST MOD 30 MIN: CPT | Performed by: NURSE PRACTITIONER

## 2024-01-29 PROCEDURE — 82043 UR ALBUMIN QUANTITATIVE: CPT

## 2024-01-29 PROCEDURE — 3074F SYST BP LT 130 MM HG: CPT | Performed by: NURSE PRACTITIONER

## 2024-01-29 PROCEDURE — 80053 COMPREHEN METABOLIC PANEL: CPT

## 2024-01-29 PROCEDURE — 82465 ASSAY BLD/SERUM CHOLESTEROL: CPT

## 2024-01-29 PROCEDURE — 83036 HEMOGLOBIN GLYCOSYLATED A1C: CPT | Performed by: NURSE PRACTITIONER

## 2024-01-29 PROCEDURE — 1036F TOBACCO NON-USER: CPT | Performed by: NURSE PRACTITIONER

## 2024-01-29 PROCEDURE — 3078F DIAST BP <80 MM HG: CPT | Performed by: NURSE PRACTITIONER

## 2024-01-29 NOTE — PROGRESS NOTES
"Roldan Emery is a 58 y.o. female presents today for a follow up of DM Type 2.  Initial diagnosis with diabetes was \"some years ago.\" The patient has a family history is her mother, maternal grandmother, sister.   Known complications include: HTN,   History of gastric sleeve and \"I did not do what I was supposed to do and the weight came back.\"       Last visit with me 6/2023  He was able to meet with PharmD once since our last visit   A1c 6.3% today.  Previous A1c 5.9% in 10/2023.     Since last visit, her mom passes away  Overall she is feeling well with mounjaro   She is not taking statin or ARB      Historical meds:   Basaglar - stopped when sugars at goal   Metformin 1000 mg twice daily - GI side effect, A1c controlled with GLP1    Current diabetes regimen is as follows:   Mounjaro 5 mg once weekly     Patient is using continuous glucose monitor - Elizabeth 3  The patient is currently checking the blood glucose as needed     Hypoglycemia frequency: 7% on but appears to be bad sensor    Hypoglycemia awareness: yes     Regarding symptoms of hyperglycemia, the patient is not experiencing any symptoms such as polyuria, polydipsia, nocturia or rapid weight loss or blurry vision. The patient comes into the office today without concerns.        ROS  General: no fever, chills or acute changes in weight in the last 6 months  Skin: no rashes, pruritis or dry skin  Cardiac: denies chest pain, heart palpitations or orthopnea  Pulmonary: denies wheezing, productive cough or exertional dyspnea      Objective    Physical Exam  Blood pressure 120/73, pulse 73, height 1.626 m (5' 4\"), weight 71.7 kg (158 lb), last menstrual period 03/15/2021.  General: not in acute distress, cooperative   Respiratory: normal respiratory effort  Musculoskeletal: normal gait       Current Outpatient Medications:     clotrimazole-betamethasone (Lotrisone) cream, twice a day. APPLY AND RUB IN A THIN FILM TO AFFECTED AREAS, Disp: , Rfl:    "  tirzepatide (Mounjaro) 5 mg/0.5 mL pen injector, Inject 5 mg under the skin 1 (one) time per week., Disp: 2 mL, Rfl: 11    Current Facility-Administered Medications:     insulin glargine (Lantus) pen 20 Units, 20 Units, subcutaneous, q AM, Ashley Larose MD    Assessment/Plan   Type 2 diabetes with hyperglycemia without long term use insulin:   Hyperlipidemia:   -A1c at goal.  Time in range is excellent.  Having 7% lows but appear she had a bad sensor.  Reinforced that she should not have any low blood sugars with Mounjaro and these were false lows due to a bad sensor.  Recommended follow-up with primary care and likely will transition after next visit.  Needs to repeat check labs.  Discussed need for statin if LDL is still high.  She is not taking atorvastatin at this time    Plan:   Continue Mounjaro 5 mg once weekly    Consider Dr. Cesar/Dr. Rosado both are at [facility] location.    Get labs    Follow up 1 year

## 2024-01-29 NOTE — PATIENT INSTRUCTIONS
Continue Mounjaro 5 mg once weekly      Consider Dr. Cesar/Dr. Rosado both are at [facility] location.      Get labs      Follow up 1 year

## 2024-02-06 DIAGNOSIS — E78.5 HYPERLIPIDEMIA, UNSPECIFIED HYPERLIPIDEMIA TYPE: Primary | ICD-10-CM

## 2024-02-06 RX ORDER — ROSUVASTATIN CALCIUM 10 MG/1
10 TABLET, COATED ORAL DAILY
Qty: 90 TABLET | Refills: 3 | Status: SHIPPED | OUTPATIENT
Start: 2024-02-06

## 2024-02-06 RX ORDER — ROSUVASTATIN CALCIUM 10 MG/1
TABLET, COATED ORAL EVERY 24 HOURS
COMMUNITY
End: 2024-02-06 | Stop reason: SDUPTHER

## 2024-02-08 ENCOUNTER — OFFICE VISIT (OUTPATIENT)
Dept: OBSTETRICS AND GYNECOLOGY | Facility: CLINIC | Age: 59
End: 2024-02-08
Payer: COMMERCIAL

## 2024-02-08 VITALS
SYSTOLIC BLOOD PRESSURE: 140 MMHG | DIASTOLIC BLOOD PRESSURE: 79 MMHG | BODY MASS INDEX: 27.49 KG/M2 | HEIGHT: 64 IN | WEIGHT: 161 LBS

## 2024-02-08 DIAGNOSIS — N89.8 VAGINAL DISCHARGE: Primary | ICD-10-CM

## 2024-02-08 PROCEDURE — 99214 OFFICE O/P EST MOD 30 MIN: CPT | Performed by: STUDENT IN AN ORGANIZED HEALTH CARE EDUCATION/TRAINING PROGRAM

## 2024-02-08 PROCEDURE — 1036F TOBACCO NON-USER: CPT | Performed by: STUDENT IN AN ORGANIZED HEALTH CARE EDUCATION/TRAINING PROGRAM

## 2024-02-08 PROCEDURE — 3078F DIAST BP <80 MM HG: CPT | Performed by: STUDENT IN AN ORGANIZED HEALTH CARE EDUCATION/TRAINING PROGRAM

## 2024-02-08 PROCEDURE — 87205 SMEAR GRAM STAIN: CPT

## 2024-02-08 PROCEDURE — 3077F SYST BP >= 140 MM HG: CPT | Performed by: STUDENT IN AN ORGANIZED HEALTH CARE EDUCATION/TRAINING PROGRAM

## 2024-02-08 PROCEDURE — 3061F NEG MICROALBUMINURIA REV: CPT | Performed by: STUDENT IN AN ORGANIZED HEALTH CARE EDUCATION/TRAINING PROGRAM

## 2024-02-08 ASSESSMENT — ENCOUNTER SYMPTOMS
EYES NEGATIVE: 0
NEUROLOGICAL NEGATIVE: 0
GASTROINTESTINAL NEGATIVE: 0
CONSTITUTIONAL NEGATIVE: 0
ALLERGIC/IMMUNOLOGIC NEGATIVE: 0
HEMATOLOGIC/LYMPHATIC NEGATIVE: 0
ENDOCRINE NEGATIVE: 0
RESPIRATORY NEGATIVE: 0
PSYCHIATRIC NEGATIVE: 0
CARDIOVASCULAR NEGATIVE: 0
MUSCULOSKELETAL NEGATIVE: 0

## 2024-02-08 ASSESSMENT — PAIN SCALES - GENERAL: PAINLEVEL: 0-NO PAIN

## 2024-02-08 NOTE — PROGRESS NOTES
Subjective   Rere Emery is a 58 y.o. female who complains of vaginal discharge/itch.    HPI:  Symptoms reported: vaginal discharge  Onset:  started after taking 4 doses of diflucan afte a dental work anwith abx and oral antifungals for hair loss from derm  Course: persistent  Progression: stayed the same    Helpful treatments: none  Unhelpful treatments tried: none    Objective   Physical Exam:   General Appearance: alert and oriented, in no acute distress  Abdomen: soft, non-tender; bowel sounds normal; no masses, no organomegaly  Pelvic Exam: external genitalia normal and grey white discahrge throuht vagina, cervix withoit lesion  Urine dipstick: not done.    Assessment/Plan   Diagnoses and all orders for this visit:  Vaginal discharge  -     Vaginitis Gram Stain For Bacterial Vaginosis + Yeast; Future    Follow up results and treat as indicated.

## 2024-02-10 LAB
CLUE CELLS VAG LPF-#/AREA: NORMAL /[LPF]
NUGENT SCORE: 2
YEAST VAG WET PREP-#/AREA: NORMAL

## 2024-02-15 ENCOUNTER — PATIENT MESSAGE (OUTPATIENT)
Dept: OBSTETRICS AND GYNECOLOGY | Facility: CLINIC | Age: 59
End: 2024-02-15
Payer: COMMERCIAL

## 2024-02-15 DIAGNOSIS — N89.8 VAGINAL DISCHARGE: Primary | ICD-10-CM

## 2024-02-19 RX ORDER — METRONIDAZOLE 500 MG/1
500 TABLET ORAL 2 TIMES DAILY
Qty: 14 TABLET | Refills: 0 | Status: SHIPPED | OUTPATIENT
Start: 2024-02-19 | End: 2024-02-20

## 2024-02-20 RX ORDER — METRONIDAZOLE 7.5 MG/G
GEL VAGINAL 2 TIMES DAILY
Qty: 70 G | Refills: 0 | Status: SHIPPED | OUTPATIENT
Start: 2024-02-20 | End: 2024-02-27

## 2024-03-06 ENCOUNTER — OFFICE VISIT (OUTPATIENT)
Dept: OBSTETRICS AND GYNECOLOGY | Facility: CLINIC | Age: 59
End: 2024-03-06
Payer: COMMERCIAL

## 2024-03-06 VITALS
BODY MASS INDEX: 28.68 KG/M2 | DIASTOLIC BLOOD PRESSURE: 92 MMHG | SYSTOLIC BLOOD PRESSURE: 155 MMHG | HEIGHT: 64 IN | WEIGHT: 168 LBS

## 2024-03-06 DIAGNOSIS — B37.31 VAGINAL YEAST INFECTION: Primary | ICD-10-CM

## 2024-03-06 DIAGNOSIS — N95.0 POST-MENOPAUSE BLEEDING: ICD-10-CM

## 2024-03-06 PROCEDURE — 99214 OFFICE O/P EST MOD 30 MIN: CPT | Performed by: STUDENT IN AN ORGANIZED HEALTH CARE EDUCATION/TRAINING PROGRAM

## 2024-03-06 PROCEDURE — 3077F SYST BP >= 140 MM HG: CPT | Performed by: STUDENT IN AN ORGANIZED HEALTH CARE EDUCATION/TRAINING PROGRAM

## 2024-03-06 PROCEDURE — 3061F NEG MICROALBUMINURIA REV: CPT | Performed by: STUDENT IN AN ORGANIZED HEALTH CARE EDUCATION/TRAINING PROGRAM

## 2024-03-06 PROCEDURE — 87102 FUNGUS ISOLATION CULTURE: CPT

## 2024-03-06 PROCEDURE — 87205 SMEAR GRAM STAIN: CPT

## 2024-03-06 PROCEDURE — 1036F TOBACCO NON-USER: CPT | Performed by: STUDENT IN AN ORGANIZED HEALTH CARE EDUCATION/TRAINING PROGRAM

## 2024-03-06 PROCEDURE — 3080F DIAST BP >= 90 MM HG: CPT | Performed by: STUDENT IN AN ORGANIZED HEALTH CARE EDUCATION/TRAINING PROGRAM

## 2024-03-06 RX ORDER — FLUCONAZOLE 150 MG/1
150 TABLET ORAL ONCE
Qty: 1 TABLET | Refills: 0 | Status: SHIPPED | OUTPATIENT
Start: 2024-03-06 | End: 2024-03-06

## 2024-03-06 ASSESSMENT — PAIN SCALES - GENERAL: PAINLEVEL: 0-NO PAIN

## 2024-03-06 ASSESSMENT — ENCOUNTER SYMPTOMS
ENDOCRINE NEGATIVE: 0
CARDIOVASCULAR NEGATIVE: 0
NEUROLOGICAL NEGATIVE: 0
EYES NEGATIVE: 0
ALLERGIC/IMMUNOLOGIC NEGATIVE: 0
HEMATOLOGIC/LYMPHATIC NEGATIVE: 0
CONSTITUTIONAL NEGATIVE: 0
RESPIRATORY NEGATIVE: 0
GASTROINTESTINAL NEGATIVE: 0
PSYCHIATRIC NEGATIVE: 0
MUSCULOSKELETAL NEGATIVE: 0

## 2024-03-06 NOTE — PROGRESS NOTES
Subjective   Patient ID: Rere Emery is a 58 y.o. female who presents for Vaginal Bleeding (Vaginal bleeding last pap 8/18/23 wnl HPV negative last mammogram 4/6/23 benign ).  Patient here for evaluation of postmenopausal vaginal bleeding in the setting of recent vaginitis.    Vaginal Bleeding        Review of Systems   Genitourinary:  Positive for vaginal bleeding.   All other systems reviewed and are negative.      Objective   Physical Exam  Vitals reviewed. Exam conducted with a chaperone present.   Constitutional:       General: She is not in acute distress.     Appearance: She is not ill-appearing.   Pulmonary:      Effort: Pulmonary effort is normal.   Genitourinary:     General: Normal vulva.      Exam position: Lithotomy position.      Vagina: No signs of injury and foreign body. Vaginal discharge present. No erythema, tenderness, bleeding, lesions or prolapsed vaginal walls.      Cervix: No discharge, friability, lesion, erythema or cervical bleeding.      Comments: Thick curdy large-volume white vaginal discharge throughout fornices  Neurological:      Mental Status: She is alert.         Assessment/Plan   Diagnoses and all orders for this visit:  Vaginal yeast infection  -     Fungal Culture/Smear  -     Vaginitis Gram Stain For Bacterial Vaginosis + Yeast  -     fluconazole (Diflucan) 150 mg tablet; Take 1 tablet (150 mg) by mouth 1 time for 1 dose.  Post-menopause bleeding  -     US PELVIS TRANSABDOMINAL WITH TRANSVAGINAL; Future    Patient here for evaluation of postmenopausal vaginal bleeding in the setting of recent vaginitis.  Patient's did have clinical evidence of vaginitis on previous visit but her swab was negative so she did not complete treatment.  She did however have some vaginal bleeding with applicator placement when she was using her Lotrisone.  Suspect that vaginal bleeding is due to trauma likely in the setting of postmenopausal vaginal atrophy nevertheless we will obtain pelvic  ultrasound and treat for clinical yeast infection.  Patient to follow-up in 1 month for symptom check will consider endometrial biopsy as indicated at that time.       Balwinder Hurley MD 03/06/24 4:45 PM

## 2024-03-07 LAB
CLUE CELLS VAG LPF-#/AREA: NORMAL /[LPF]
NUGENT SCORE: 3
YEAST VAG WET PREP-#/AREA: NORMAL

## 2024-03-13 ENCOUNTER — HOSPITAL ENCOUNTER (OUTPATIENT)
Dept: RADIOLOGY | Facility: HOSPITAL | Age: 59
Discharge: HOME | End: 2024-03-13
Payer: COMMERCIAL

## 2024-03-13 DIAGNOSIS — N95.0 POST-MENOPAUSE BLEEDING: ICD-10-CM

## 2024-03-13 PROCEDURE — 76856 US EXAM PELVIC COMPLETE: CPT

## 2024-03-22 ENCOUNTER — APPOINTMENT (OUTPATIENT)
Dept: OBSTETRICS AND GYNECOLOGY | Facility: CLINIC | Age: 59
End: 2024-03-22
Payer: COMMERCIAL

## 2024-03-25 LAB
FUNGUS SPEC CULT: NORMAL
FUNGUS SPEC FUNGUS STN: NORMAL

## 2024-04-03 ENCOUNTER — OFFICE VISIT (OUTPATIENT)
Dept: OBSTETRICS AND GYNECOLOGY | Facility: CLINIC | Age: 59
End: 2024-04-03
Payer: COMMERCIAL

## 2024-04-03 VITALS
WEIGHT: 172 LBS | BODY MASS INDEX: 29.37 KG/M2 | HEIGHT: 64 IN | SYSTOLIC BLOOD PRESSURE: 154 MMHG | DIASTOLIC BLOOD PRESSURE: 89 MMHG

## 2024-04-03 DIAGNOSIS — N95.0 POST-MENOPAUSE BLEEDING: Primary | ICD-10-CM

## 2024-04-03 PROCEDURE — 3079F DIAST BP 80-89 MM HG: CPT | Performed by: STUDENT IN AN ORGANIZED HEALTH CARE EDUCATION/TRAINING PROGRAM

## 2024-04-03 PROCEDURE — 3061F NEG MICROALBUMINURIA REV: CPT | Performed by: STUDENT IN AN ORGANIZED HEALTH CARE EDUCATION/TRAINING PROGRAM

## 2024-04-03 PROCEDURE — 3077F SYST BP >= 140 MM HG: CPT | Performed by: STUDENT IN AN ORGANIZED HEALTH CARE EDUCATION/TRAINING PROGRAM

## 2024-04-03 PROCEDURE — 99213 OFFICE O/P EST LOW 20 MIN: CPT | Performed by: STUDENT IN AN ORGANIZED HEALTH CARE EDUCATION/TRAINING PROGRAM

## 2024-04-03 ASSESSMENT — ENCOUNTER SYMPTOMS
ALLERGIC/IMMUNOLOGIC NEGATIVE: 0
ENDOCRINE NEGATIVE: 0
CARDIOVASCULAR NEGATIVE: 0
CONSTITUTIONAL NEGATIVE: 0
PSYCHIATRIC NEGATIVE: 0
GASTROINTESTINAL NEGATIVE: 0
EYES NEGATIVE: 0
HEMATOLOGIC/LYMPHATIC NEGATIVE: 0
MUSCULOSKELETAL NEGATIVE: 0
RESPIRATORY NEGATIVE: 0
NEUROLOGICAL NEGATIVE: 0

## 2024-04-03 ASSESSMENT — PAIN SCALES - GENERAL: PAINLEVEL: 0-NO PAIN

## 2024-04-03 NOTE — PROGRESS NOTES
Subjective   Patient ID: Rere Emery is a 59 y.o. female who presents for Follow-up (Follow up visit last pap 8/18/23 wnl HPV negative last mammogram 4/6/23 benign ).  Here for follow-up of postmenopausal vaginal bleeding yeast vaginitis and ultrasound results.  Patient with no acute complaints denies any additional postmenopausal vaginal bleeding and says that she now has minimal normal discharge.        Review of Systems   All other systems reviewed and are negative.      Objective   Physical Exam  Vitals reviewed.   Constitutional:       General: She is not in acute distress.     Appearance: She is not ill-appearing.   Pulmonary:      Effort: Pulmonary effort is normal.   Skin:     Coloration: Skin is not pale.   Neurological:      Mental Status: She is alert.         Assessment/Plan   Diagnoses and all orders for this visit:  Post-menopause bleeding    Patient here for follow-up.  Vaginitis symptoms have resolved patient denies any recurrent postmenopausal vaginal bleeding.  Transvaginal ultrasound reviewed.  See with 3 mm.  Patient does have a fibroid uterus.  Fluid in the endometrial canal cell threshold will be extremely low to biopsy patient in fact biopsy was offered today which patient declined.  Patient to call the office if she has any vaginal bleeding whatsoever and we will arrange for endometrial biopsy at that time.  Next annual visit should be in August.       Balwinder Hurley MD 04/03/24 10:39 AM   
Statement Selected

## 2024-04-11 ENCOUNTER — TELEPHONE (OUTPATIENT)
Dept: ENDOCRINOLOGY | Facility: CLINIC | Age: 59
End: 2024-04-11
Payer: COMMERCIAL

## 2024-04-11 DIAGNOSIS — E11.65 TYPE 2 DIABETES MELLITUS WITH HYPERGLYCEMIA, WITHOUT LONG-TERM CURRENT USE OF INSULIN (MULTI): Primary | ICD-10-CM

## 2024-04-11 RX ORDER — DEXTROSE 4 G
TABLET,CHEWABLE ORAL
Qty: 1 EACH | Refills: 0 | Status: SHIPPED | OUTPATIENT
Start: 2024-04-11

## 2024-04-11 RX ORDER — LANCETS 33 GAUGE
EACH MISCELLANEOUS
Qty: 100 EACH | Refills: 3 | Status: SHIPPED | OUTPATIENT
Start: 2024-04-11

## 2024-04-11 RX ORDER — BLOOD SUGAR DIAGNOSTIC
STRIP MISCELLANEOUS
Qty: 100 EACH | Refills: 3 | Status: SHIPPED | OUTPATIENT
Start: 2024-04-11

## 2024-04-11 NOTE — TELEPHONE ENCOUNTER
Received fax from [facility] pharmacy requesting a new rx for one touch ultra 2 kit. Do we need to send test strips and lancets too?

## 2024-04-11 NOTE — TELEPHONE ENCOUNTER
Orders Placed This Encounter      blood-glucose meter (OneTouch Ultra2 Meter) misc      lancets 33 gauge misc      OneTouch Ultra Test strip

## 2024-04-15 ENCOUNTER — APPOINTMENT (OUTPATIENT)
Dept: PRIMARY CARE | Facility: CLINIC | Age: 59
End: 2024-04-15
Payer: COMMERCIAL

## 2024-05-03 ENCOUNTER — APPOINTMENT (OUTPATIENT)
Dept: PRIMARY CARE | Facility: CLINIC | Age: 59
End: 2024-05-03
Payer: COMMERCIAL

## 2024-05-03 ENCOUNTER — OFFICE VISIT (OUTPATIENT)
Dept: OBSTETRICS AND GYNECOLOGY | Facility: CLINIC | Age: 59
End: 2024-05-03
Payer: COMMERCIAL

## 2024-05-03 VITALS
DIASTOLIC BLOOD PRESSURE: 73 MMHG | WEIGHT: 174 LBS | BODY MASS INDEX: 29.87 KG/M2 | SYSTOLIC BLOOD PRESSURE: 121 MMHG | RESPIRATION RATE: 16 BRPM

## 2024-05-03 DIAGNOSIS — N95.0 POST-MENOPAUSE BLEEDING: Primary | ICD-10-CM

## 2024-05-03 PROCEDURE — 3074F SYST BP LT 130 MM HG: CPT | Performed by: STUDENT IN AN ORGANIZED HEALTH CARE EDUCATION/TRAINING PROGRAM

## 2024-05-03 PROCEDURE — 88305 TISSUE EXAM BY PATHOLOGIST: CPT | Performed by: PATHOLOGY

## 2024-05-03 PROCEDURE — 99214 OFFICE O/P EST MOD 30 MIN: CPT | Performed by: STUDENT IN AN ORGANIZED HEALTH CARE EDUCATION/TRAINING PROGRAM

## 2024-05-03 PROCEDURE — 3078F DIAST BP <80 MM HG: CPT | Performed by: STUDENT IN AN ORGANIZED HEALTH CARE EDUCATION/TRAINING PROGRAM

## 2024-05-03 PROCEDURE — 88305 TISSUE EXAM BY PATHOLOGIST: CPT

## 2024-05-03 PROCEDURE — 3061F NEG MICROALBUMINURIA REV: CPT | Performed by: STUDENT IN AN ORGANIZED HEALTH CARE EDUCATION/TRAINING PROGRAM

## 2024-05-03 PROCEDURE — 58100 BIOPSY OF UTERUS LINING: CPT | Performed by: STUDENT IN AN ORGANIZED HEALTH CARE EDUCATION/TRAINING PROGRAM

## 2024-05-03 ASSESSMENT — ENCOUNTER SYMPTOMS
NEUROLOGICAL NEGATIVE: 0
PSYCHIATRIC NEGATIVE: 0
ENDOCRINE NEGATIVE: 0
ALLERGIC/IMMUNOLOGIC NEGATIVE: 0
CARDIOVASCULAR NEGATIVE: 0
GASTROINTESTINAL NEGATIVE: 0
HEMATOLOGIC/LYMPHATIC NEGATIVE: 0
EYES NEGATIVE: 0
CONSTITUTIONAL NEGATIVE: 0
RESPIRATORY NEGATIVE: 0
MUSCULOSKELETAL NEGATIVE: 0

## 2024-05-03 ASSESSMENT — PAIN SCALES - GENERAL: PAINLEVEL: 0-NO PAIN

## 2024-05-03 NOTE — PROGRESS NOTES
Subjective   Patient ID: Rere Emery is a 59 y.o. female who presents for Vaginal Bleeding (Pt C/O AUB x3 days currently. ).  Patient here for recurrent postmenopausal vaginal bleeding.  Patient says that she has been having bleeding for the last 3 days.  Patient is amenable to endometrial biopsy.    Vaginal Bleeding        Review of Systems   Genitourinary:  Positive for vaginal bleeding.   All other systems reviewed and are negative.      Objective   Physical Exam  Vitals reviewed. Exam conducted with a chaperone present.   Constitutional:       General: She is not in acute distress.     Appearance: She is not ill-appearing.   Pulmonary:      Effort: Pulmonary effort is normal.   Genitourinary:     General: Normal vulva.      Exam position: Lithotomy position.      Vagina: No signs of injury and foreign body. No vaginal discharge, erythema, tenderness, lesions or prolapsed vaginal walls.      Cervix: No discharge, friability, lesion or erythema.      Comments: Dark blood throughout vagina with efflux through cervical os.  Neurological:      Mental Status: She is alert.         Assessment/Plan   Diagnoses and all orders for this visit:  Post-menopause bleeding  -     Surgical Pathology Exam    Patient here for recurrent postmenopausal vaginal bleeding endometrial biopsy performed we will follow-up results.    Patient ID: Rere Emery is a 59 y.o. female.    Endometrial biopsy    Date/Time: 5/3/2024 1:43 PM    Performed by: Balwinder Hurley MD  Authorized by: Balwinder Hurley MD    Consent:     Consent obtained: verbal and written    Consent given by: patient    Risks discussed: bleeding, damage to other organs, infection and need for repeat procedure    Alternatives discussed: alternative treatment  Indications:     Indications: postmenopausal bleeding      Chronicity of post-menopausal bleeding: recurrent  Procedure:     A bimanual exam was performed: no      Tenaculum used: no      A local block was  performed: no      Number of passes: 3  Findings:     Cervix: normal      Specimen collected: specimen collected and sent to pathology      Patient tolerance: tolerated well, no immediate complications         Balwinder Hurley MD 05/03/24 1:41 PM

## 2024-05-10 LAB
LABORATORY COMMENT REPORT: NORMAL
PATH REPORT.FINAL DX SPEC: NORMAL
PATH REPORT.GROSS SPEC: NORMAL
PATH REPORT.RELEVANT HX SPEC: NORMAL
PATH REPORT.TOTAL CANCER: NORMAL

## 2024-05-31 ENCOUNTER — OFFICE VISIT (OUTPATIENT)
Dept: OBSTETRICS AND GYNECOLOGY | Facility: CLINIC | Age: 59
End: 2024-05-31
Payer: COMMERCIAL

## 2024-05-31 VITALS
WEIGHT: 179.8 LBS | BODY MASS INDEX: 30.7 KG/M2 | HEIGHT: 64 IN | SYSTOLIC BLOOD PRESSURE: 126 MMHG | DIASTOLIC BLOOD PRESSURE: 71 MMHG

## 2024-05-31 DIAGNOSIS — N95.0 POST-MENOPAUSE BLEEDING: Primary | ICD-10-CM

## 2024-05-31 PROCEDURE — 3078F DIAST BP <80 MM HG: CPT | Performed by: STUDENT IN AN ORGANIZED HEALTH CARE EDUCATION/TRAINING PROGRAM

## 2024-05-31 PROCEDURE — 3061F NEG MICROALBUMINURIA REV: CPT | Performed by: STUDENT IN AN ORGANIZED HEALTH CARE EDUCATION/TRAINING PROGRAM

## 2024-05-31 PROCEDURE — 99213 OFFICE O/P EST LOW 20 MIN: CPT | Performed by: STUDENT IN AN ORGANIZED HEALTH CARE EDUCATION/TRAINING PROGRAM

## 2024-05-31 PROCEDURE — 1036F TOBACCO NON-USER: CPT | Performed by: STUDENT IN AN ORGANIZED HEALTH CARE EDUCATION/TRAINING PROGRAM

## 2024-05-31 PROCEDURE — 3074F SYST BP LT 130 MM HG: CPT | Performed by: STUDENT IN AN ORGANIZED HEALTH CARE EDUCATION/TRAINING PROGRAM

## 2024-05-31 ASSESSMENT — PATIENT HEALTH QUESTIONNAIRE - PHQ9
1. LITTLE INTEREST OR PLEASURE IN DOING THINGS: NOT AT ALL
SUM OF ALL RESPONSES TO PHQ9 QUESTIONS 1 AND 2: 0
2. FEELING DOWN, DEPRESSED OR HOPELESS: NOT AT ALL

## 2024-05-31 ASSESSMENT — PAIN SCALES - GENERAL: PAINLEVEL: 0-NO PAIN

## 2024-05-31 ASSESSMENT — ENCOUNTER SYMPTOMS
PSYCHIATRIC NEGATIVE: 0
ALLERGIC/IMMUNOLOGIC NEGATIVE: 0
EYES NEGATIVE: 0
ENDOCRINE NEGATIVE: 0
MUSCULOSKELETAL NEGATIVE: 0
RESPIRATORY NEGATIVE: 0
HEMATOLOGIC/LYMPHATIC NEGATIVE: 0
NEUROLOGICAL NEGATIVE: 0
CONSTITUTIONAL NEGATIVE: 0
CARDIOVASCULAR NEGATIVE: 0
GASTROINTESTINAL NEGATIVE: 0

## 2024-05-31 NOTE — PROGRESS NOTES
Subjective   Patient ID: Rere Emery is a 59 y.o. female who presents for Follow-up (Last Pap 8/18/23 wnl. HPV -. Mammogram 4/6/23 -.).  Patient here for endometrial biopsy results.  Patient reports that she does continue to have intermittent spotting.        Review of Systems   All other systems reviewed and are negative.      Objective   Physical Exam  Vitals reviewed.   Constitutional:       General: She is not in acute distress.     Appearance: She is not ill-appearing.   Pulmonary:      Effort: Pulmonary effort is normal.   Skin:     Coloration: Skin is not pale.   Neurological:      Mental Status: She is alert.         Assessment/Plan   Diagnoses and all orders for this visit:  Post-menopause bleeding    Patient here for postmenopausal vaginal bleeding follow-up.  Patient had endometrial biopsy at last visit for which results have been benign.  Patient does report continued spotting.  Will have patient follow-up virtually in 1 month and if her spotting has not resolved will consider hysteroscopic evaluation.       Balwinder Hurley MD 05/31/24 1:41 PM

## 2024-08-15 DIAGNOSIS — E11.649 TYPE 2 DIABETES MELLITUS WITH HYPOGLYCEMIA WITHOUT COMA, WITHOUT LONG-TERM CURRENT USE OF INSULIN (MULTI): ICD-10-CM

## 2024-08-16 RX ORDER — TIRZEPATIDE 5 MG/.5ML
INJECTION, SOLUTION SUBCUTANEOUS
Qty: 6 ML | Refills: 1 | Status: SHIPPED | OUTPATIENT
Start: 2024-08-16

## 2025-01-29 ENCOUNTER — APPOINTMENT (OUTPATIENT)
Dept: ENDOCRINOLOGY | Facility: CLINIC | Age: 60
End: 2025-01-29
Payer: COMMERCIAL

## 2025-01-29 VITALS
HEIGHT: 65 IN | DIASTOLIC BLOOD PRESSURE: 85 MMHG | HEART RATE: 78 BPM | WEIGHT: 191.4 LBS | SYSTOLIC BLOOD PRESSURE: 146 MMHG | TEMPERATURE: 96.7 F | BODY MASS INDEX: 31.89 KG/M2

## 2025-01-29 DIAGNOSIS — E78.5 HYPERLIPIDEMIA, UNSPECIFIED HYPERLIPIDEMIA TYPE: ICD-10-CM

## 2025-01-29 DIAGNOSIS — E66.811 OBESITY (BMI 30.0-34.9): ICD-10-CM

## 2025-01-29 DIAGNOSIS — E11.65 TYPE 2 DIABETES MELLITUS WITH HYPERGLYCEMIA, WITHOUT LONG-TERM CURRENT USE OF INSULIN: Primary | ICD-10-CM

## 2025-01-29 LAB — POC HEMOGLOBIN A1C: 8.2 % (ref 4.2–6.5)

## 2025-01-29 PROCEDURE — 1036F TOBACCO NON-USER: CPT | Performed by: NURSE PRACTITIONER

## 2025-01-29 PROCEDURE — 99214 OFFICE O/P EST MOD 30 MIN: CPT | Performed by: NURSE PRACTITIONER

## 2025-01-29 PROCEDURE — 83036 HEMOGLOBIN GLYCOSYLATED A1C: CPT | Performed by: NURSE PRACTITIONER

## 2025-01-29 PROCEDURE — 3077F SYST BP >= 140 MM HG: CPT | Performed by: NURSE PRACTITIONER

## 2025-01-29 PROCEDURE — 3008F BODY MASS INDEX DOCD: CPT | Performed by: NURSE PRACTITIONER

## 2025-01-29 PROCEDURE — 3079F DIAST BP 80-89 MM HG: CPT | Performed by: NURSE PRACTITIONER

## 2025-01-29 RX ORDER — TIRZEPATIDE 5 MG/.5ML
5 INJECTION, SOLUTION SUBCUTANEOUS
Qty: 2 ML | Refills: 5 | Status: SHIPPED | OUTPATIENT
Start: 2025-02-02

## 2025-01-29 RX ORDER — BLOOD-GLUCOSE SENSOR
EACH MISCELLANEOUS
Qty: 6 EACH | Refills: 3 | Status: SHIPPED | OUTPATIENT
Start: 2025-01-29

## 2025-01-29 RX ORDER — TIRZEPATIDE 2.5 MG/.5ML
2.5 INJECTION, SOLUTION SUBCUTANEOUS
COMMUNITY
Start: 2025-02-02

## 2025-01-29 ASSESSMENT — ENCOUNTER SYMPTOMS
LOSS OF SENSATION IN FEET: 0
OCCASIONAL FEELINGS OF UNSTEADINESS: 0
DEPRESSION: 0

## 2025-01-29 NOTE — PATIENT INSTRUCTIONS
Restart Mounjaro 2.5 mg once weekly   Use up the 4 pens and then I sent the 5 mg dose to the pharmacy     Restart Elizabeth 3+   This is a 15 day sensor and works with the tu.nr 3 klever     Get updated fasting labs and urine      Follow up with Ebne     Follow up in 5-6 months      Schedule with primary care Dr. Cesar/Dr. Rosado both are at [facility] location  Ruma Edgar CNP at [facility]

## 2025-01-29 NOTE — PROGRESS NOTES
"Roldan Emery is a 59 y.o. female presents today for a follow up of DM Type 2.  Initial diagnosis with diabetes was \"some years ago.\" The patient has a family history is her mother, maternal grandmother, sister.   Known complications include: HTN,   History of gastric sleeve and \"I did not do what I was supposed to do and the weight came back.\"       Last visit with me 1/2024  He was able to meet with PharmD in the past  A1c 8.2% today.  Previous A1c 6.3% in 1/2024.     Since last visit, has not been on any medications since June    had a change in insurance   Cannot afford with old insurance    Now with new insurance she would like to get back on track.    Weight is back up without Mounjaro       Historical meds:   Basaglar - stopped when sugars at goal   Metformin 1000 mg twice daily - GI side effect, A1c controlled with GLP1  Mounjaro 5 mg- controlled but stopped due to lapse in insurance      Current diabetes regimen is as follows:   None    Patient is not using continuous glucose monitor - Elizabeth 3  The patient is not currently checking the blood glucose as needed     Hypoglycemia frequency: Denies   Hypoglycemia awareness: yes     The patient comes into the office today with hyperglycemia       ROS  General: no fever, chills.  Weight is up 15+ lbs   Skin: no rashes, pruritis or dry skin  Cardiac: denies chest pain, heart palpitations or orthopnea  Pulmonary: denies wheezing, productive cough or exertional dyspnea      Objective    Physical Exam  Blood pressure 146/85, pulse 78, temperature 35.9 °C (96.7 °F), temperature source Temporal, height 1.638 m (5' 4.5\"), weight 86.8 kg (191 lb 6.4 oz), last menstrual period 03/15/2021.  General: not in acute distress, cooperative   Respiratory: normal respiratory effort  Musculoskeletal: normal gait       Current Outpatient Medications:   None    Assessment/Plan   Type 2 diabetes with hyperglycemia without long term use insulin:   Obesity, BMI " 32  Hyperlipidemia  - A1c not at goal.  She is not checking her blood sugars as she is been without Elizabeth.  Willing to restart.  She was out of insurance.  Discussed restarting Mounjaro.  She was previously at goal with this medication.  Will also help with weight gain that happened when she was off.   Recommended follow-up with primary care and once at goal can transition back.  Needs to repeat check labs.     Plan:   Restart Mounjaro 2.5 mg once weekly   Use up the 4 pens and then I sent the 5 mg dose to the pharmacy   Restart Elizabeth 3+   Get updated fasting labs and urine    Follow up with Maritza in 2 months   Follow up in 5-6 months

## 2025-01-30 ENCOUNTER — TELEPHONE (OUTPATIENT)
Dept: ENDOCRINOLOGY | Facility: CLINIC | Age: 60
End: 2025-01-30
Payer: COMMERCIAL

## 2025-01-30 NOTE — TELEPHONE ENCOUNTER
Prior Auth for susan 3 plus pending determination  Submitted on 1/30 via [facility]    KEY: [id number]

## 2025-02-05 LAB
ALBUMIN SERPL-MCNC: 4.7 G/DL (ref 3.6–5.1)
ALP SERPL-CCNC: 82 U/L (ref 37–153)
ALT SERPL-CCNC: 15 U/L (ref 6–29)
ANION GAP SERPL CALCULATED.4IONS-SCNC: 10 MMOL/L (CALC) (ref 7–17)
AST SERPL-CCNC: 16 U/L (ref 10–35)
BILIRUB SERPL-MCNC: 0.6 MG/DL (ref 0.2–1.2)
BUN SERPL-MCNC: 15 MG/DL (ref 7–25)
CALCIUM SERPL-MCNC: 9.5 MG/DL (ref 8.6–10.4)
CHLORIDE SERPL-SCNC: 104 MMOL/L (ref 98–110)
CHOLEST SERPL-MCNC: 271 MG/DL
CHOLEST/HDLC SERPL: 5.8 (CALC)
CO2 SERPL-SCNC: 27 MMOL/L (ref 20–32)
CREAT SERPL-MCNC: 1 MG/DL (ref 0.5–1.03)
EGFRCR SERPLBLD CKD-EPI 2021: 65 ML/MIN/1.73M2
GLUCOSE SERPL-MCNC: 113 MG/DL (ref 65–99)
HDLC SERPL-MCNC: 47 MG/DL
LDLC SERPL CALC-MCNC: 202 MG/DL (CALC)
NONHDLC SERPL-MCNC: 224 MG/DL (CALC)
POTASSIUM SERPL-SCNC: 4.2 MMOL/L (ref 3.5–5.3)
PROT SERPL-MCNC: 8 G/DL (ref 6.1–8.1)
SODIUM SERPL-SCNC: 141 MMOL/L (ref 135–146)
TRIGL SERPL-MCNC: 101 MG/DL
TSH SERPL-ACNC: 0.93 MIU/L (ref 0.4–4.5)

## 2025-03-31 ENCOUNTER — APPOINTMENT (OUTPATIENT)
Dept: ENDOCRINOLOGY | Facility: CLINIC | Age: 60
End: 2025-03-31
Payer: COMMERCIAL

## 2025-03-31 DIAGNOSIS — E11.65 TYPE 2 DIABETES MELLITUS WITH HYPERGLYCEMIA, WITHOUT LONG-TERM CURRENT USE OF INSULIN: ICD-10-CM

## 2025-03-31 PROCEDURE — 99211 OFF/OP EST MAY X REQ PHY/QHP: CPT | Performed by: PHARMACIST

## 2025-03-31 RX ORDER — ROSUVASTATIN CALCIUM 10 MG/1
10 TABLET, COATED ORAL DAILY
Qty: 90 TABLET | Refills: 3 | Status: SHIPPED | OUTPATIENT
Start: 2025-03-31 | End: 2026-03-31

## 2025-03-31 NOTE — PROGRESS NOTES
Clinical Pharmacy Team contacted met with Rere Emery regarding a consultation for diabetes management thanks to a referral from Shila Bolaños CNP. Below is a summary of our conversation and recommendations:  ________________________________________________________________________      Allergies   Allergen Reactions    Lisinopril Cough    Losartan Cough    Penicillins Itching       Objective     LMP 03/15/2021     Past Medical History:   Diagnosis Date    Candidiasis, unspecified 04/22/2014    Yeast infection    Cough, unspecified 04/10/2014    Cough    Decreased libido 09/12/2014    Decreased libido    Encounter for other screening for malignant neoplasm of breast 04/07/2015    Breast cancer screening    Paresthesia of skin 06/24/2013    Tingling    Personal history of other diseases of the circulatory system 04/26/2021    History of hypertension    Personal history of other diseases of the female genital tract 09/12/2014    History of vaginitis    Personal history of other diseases of the female genital tract 04/07/2015    History of vaginal discharge    Strain of muscle, fascia and tendon at neck level, initial encounter 08/25/2016    Acute strain of neck muscle, initial encounter         Current Outpatient Medications on File Prior to Visit   Medication Sig Dispense Refill    blood-glucose sensor (FreeStyle Elizabeth 3 Plus Sensor) device Change every 15 days 6 each 3    tirzepatide (Mounjaro) 2.5 mg/0.5 mL pen injector Inject 2.5 mg under the skin 1 (one) time per week.      tirzepatide (Mounjaro) 5 mg/0.5 mL pen injector Inject 5 mg under the skin 1 (one) time per week. 2 mL 5     Current Facility-Administered Medications on File Prior to Visit   Medication Dose Route Frequency Provider Last Rate Last Admin    insulin glargine (Lantus) pen 20 Units  20 Units subcutaneous q AM Ashley Larose MD           Lab Review  Lab Results   Component Value Date    BILITOT 0.6 02/04/2025    CALCIUM 9.5  2025    CO2 27 2025     2025    CREATININE 1.00 2025    GLUCOSE 113 (H) 2025    ALKPHOS 82 2025    K 4.2 2025    PROT 8.0 2025     2025    AST 16 2025    ALT 15 2025    BUN 15 2025    ANIONGAP 10 2025    ALBUMIN 4.7 2025    GFRF 85 2023     Lab Results   Component Value Date    TRIG 101 2025    CHOL 271 (H) 2025    LDLCALC 202 (H) 2025    HDL 47 (L) 2025     Lab Results   Component Value Date    HGBA1C 8.2 (A) 2025    HGBA1C 6.3 2024    HGBA1C 5.9 10/25/2023     The 10-year ASCVD risk score (Consuelo CORDOVA, et al., 2019) is: 22.5%    Values used to calculate the score:      Age: 60 years      Sex: Female      Is Non- : Yes      Diabetic: Yes      Tobacco smoker: No      Systolic Blood Pressure: 146 mmHg      Is BP treated: No      HDL Cholesterol: 47 mg/dL      Total Cholesterol: 271 mg/dL      Assessment/Plan     The patient reports today for a diabetes consultation. Reviewed DM regimen with the patient. Patient reports impvoing glucose trends. Discussed DM care with the patient. LDL not at goal currently. Discussed starting a statin medication which she was agreeable to. Will also aim to get updated labs. Answered all patient questions.        PATIENT EDUCATION/GOALS    Goals  Fasting B - 130 mg/dL  Postprandial BG: less than 180 mg/dL  A1c: less than 7%    Type of encounter: [ in person/virtual ]    Provided counseling on lifestyle modifications, medications, and self-monitoring. Patient has no additional questions at this time. Pharmacy to follow up in 2-3 months. Please reach out with any questions. Thank you.       Maritza Castro, PharmD    Provider on site: Shila Bolaños CNP  Continue all meds under the continuation of care with the referring provider and clinical pharmacy team.

## 2025-04-14 ENCOUNTER — APPOINTMENT (OUTPATIENT)
Dept: ENDOCRINOLOGY | Facility: CLINIC | Age: 60
End: 2025-04-14
Payer: COMMERCIAL

## 2025-04-14 VITALS — HEIGHT: 65 IN | BODY MASS INDEX: 32.35 KG/M2

## 2025-04-14 DIAGNOSIS — E78.00 HYPERCHOLESTEROLEMIA: Primary | ICD-10-CM

## 2025-04-14 DIAGNOSIS — E11.65 TYPE 2 DIABETES MELLITUS WITH HYPERGLYCEMIA, WITHOUT LONG-TERM CURRENT USE OF INSULIN: ICD-10-CM

## 2025-04-14 DIAGNOSIS — Z71.3 DIETARY COUNSELING: ICD-10-CM

## 2025-04-14 PROCEDURE — 97802 MEDICAL NUTRITION INDIV IN: CPT | Performed by: DIETITIAN, REGISTERED

## 2025-04-14 NOTE — Clinical Note
Can you please place this pt on my schedule on 5/19/25 at 9 AM for a virtual visit please. Already confirmed with pt and does not require a call.  Thank you

## 2025-04-14 NOTE — PATIENT INSTRUCTIONS
- Please refer to your book entitled: Your Mediterranean Meal Plan, and follow Mediterranean Diet eating guidelines as reviewed for incorporation of an eating style that has positive impacts on both Cholesterol lowering and good blood glucose control as discussed.  - As discussed, with your preference to not incorporate carbohydrate counting, the Healthy Plate style of eating can be a helpful tool for incorporating healthy balanced meals in appropriate portions to assist in meeting your health goals. (Healthy Plate: Start with a 9-inch diameter plate. Fill 1/2 the plate with non-starchy vegetables, 1/4 of the plate with whole grains or starchy vegetables, and 1/4 of the plate with a lean source of protein.   - Please aim for a source of healthy protein and fiber rich foods at meals as discussed for nutrition needs.   - Consider pre-planning healthy meals for the week. Refer to your book for both menu and recipe ideas to get you started.  - Please consider eliminating daily fast food intakes.  - Aim for 64 ounces of water daily and please consider working towards complete elimination of sweet tea.  - Aim for 150 minutes of moderate-intensity physical activity per week.   - Follow-up with nutrition in May as scheduled.

## 2025-04-14 NOTE — PROGRESS NOTES
"Initial Nutrition Assessment    Patient was referred to nutrition by Shila Bolaños, APRN-CNP and Maritza Castro PharmD for elevated CHOL and Type 2 DM. Other PMHX significant for history of LSG. Pertinent labs reviewed which show CHOL of 271 mg/dL, LDL of 202 mg/dL and an A1c of 8.2%, Currently taking medications Crestor and Mounjaro.     Diet recall reveals a meal pattern of two meals daily and 0-1 snacks. One meal daily is typically fast food with no intakes of protein, and other meal is sometimes fried foods as well with little protein, all likely contributing to elevated CHOL and BG levels over time.  Fluids not fully meeting recommendations in type and amount as pt reports regular consumption of sweet tea, further likely limiting achievement of nutrition related goals. Pt is not incorporating consistent physical activity at this time and would likely benefit from increased structured activity to assist in achieving goals. See all interventions/recommendations below as discussed during visit this day.     Anthropometrics:  Height:   Ht Readings from Last 1 Encounters:   01/29/25 1.638 m (5' 4.5\")      Weight:   Wt Readings from Last 10 Encounters:   01/29/25 86.8 kg (191 lb 6.4 oz)   05/31/24 81.6 kg (179 lb 12.8 oz)   05/03/24 78.9 kg (174 lb)   04/03/24 78 kg (172 lb)   03/06/24 76.2 kg (168 lb)   02/08/24 73 kg (161 lb)   01/29/24 71.7 kg (158 lb)   07/06/23 79.4 kg (175 lb)   06/30/23 80.7 kg (178 lb)   04/25/23 79.8 kg (176 lb)      Current BMI:   BMI Readings from Last 1 Encounters:   01/29/25 32.35 kg/m²        Labs:  Lab Results   Component Value Date    HGBA1C 8.2 (A) 01/29/2025      Lab Results   Component Value Date    CHOL 271 (H) 02/04/2025    CHOL 222 (H) 01/29/2024    CHOL 227 (H) 04/03/2023     Lab Results   Component Value Date    HDL 47 (L) 02/04/2025    HDL 43.5 01/29/2024    HDL 45.3 04/03/2023     Lab Results   Component Value Date    LDLCALC 202 (H) 02/04/2025     Lab Results   Component Value " Date    TRIG 101 02/04/2025    TRIG 251 (H) 04/03/2023       Malnutrition Screening:  Significant Unintentional weight loss: No  Eating less than 75% of usual intake for more than 2 weeks: No  Potential Signs of Inflammation: No    Recommended Malnutrition Diagnosis: No malnutrition identified    Diet Recall-  Breakfast- skips and has for years by report  Lunch and or Dinner- [employer] two small fries and a sweet tea OR salad with various proteins on top OR soul rolls  Daily Snacks- none OR occasional cheese curls, etc.   Beverages- sweet tea and water   Alcohol- none  Physical Activity- none    Other pertinent patient reported Information:  - Pt currently taking medication Mounjaro which recently restarted in January of this year.   - States portions are small but types of foods are very unhealthy (fried and sugary items)  - Had LSG in 2014.  - Wears the Elizabeth sensor and states although some low BG warnings have occurred, was not symptomatic and unsure if it was a pressure low.     Nutrition Diagnosis: Food and nutrition-related knowledge deficit related to lack of recent exposure to information as evidenced by abnormal Hgb A1C and elevated CHOL levels .    Readiness to Learn:  Cognitive ability: Alert and oriented  Motivation to learn: Interested  Family Support: Unable to assess- family not present  Instruction provided to: Patient  Patient learns best by: Multiple methods  Factors affecting learning: None   Physical limitations affecting learning: None    Education Materials Provided:   Your Mediterranean Meal Plan Booklet    Nutrition Interventions/Recommendations for 4/14/2025:  - Please refer to your book entitled: Your Mediterranean Meal Plan, and follow Mediterranean Diet eating guidelines as reviewed for incorporation of an eating style that has positive impacts on both Cholesterol lowering and good blood glucose control as discussed.  - As discussed, with your preference to not incorporate carbohydrate  counting, the Healthy Plate style of eating can be a helpful tool for incorporating healthy balanced meals in appropriate portions to assist in meeting your health goals. (Healthy Plate: Start with a 9-inch diameter plate. Fill 1/2 the plate with non-starchy vegetables, 1/4 of the plate with whole grains or starchy vegetables, and 1/4 of the plate with a lean source of protein.   - Please aim for a source of healthy protein and fiber rich foods at meals as discussed for nutrition needs.   - Consider pre-planning healthy meals for the week. Refer to your book for both menu and recipe ideas to get you started.  - Please consider eliminating daily fast food intakes.  - Aim for 64 ounces of water daily and please consider working towards complete elimination of sweet tea.  - Aim for 150 minutes of moderate-intensity physical activity per week.   - Follow-up with nutrition in May as scheduled.      Nutrition Monitoring & Evaluation: adherence to recommendations and patient stated goals    Need for follow-up:  May    Referred by: TIERNEY Castillo-CNP and Maritza KrugerD    MNT Billing Type: Medical Nutrition Assessment, each 15 min increment, for 3 increments.    SIGNATURE:   Erica Carpenter RD, CSOWM, LD, CDCES                                                        DATE:   4/14/2025

## 2025-05-19 ENCOUNTER — APPOINTMENT (OUTPATIENT)
Dept: ENDOCRINOLOGY | Facility: CLINIC | Age: 60
End: 2025-05-19
Payer: COMMERCIAL

## 2025-06-03 ENCOUNTER — APPOINTMENT (OUTPATIENT)
Dept: ENDOCRINOLOGY | Facility: CLINIC | Age: 60
End: 2025-06-03
Payer: COMMERCIAL

## 2025-06-06 LAB
ALBUMIN/CREAT UR: 3 MG/G CREAT
CREAT UR-MCNC: 209 MG/DL (ref 20–275)
MICROALBUMIN UR-MCNC: 0.6 MG/DL
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
## 2025-06-09 ENCOUNTER — APPOINTMENT (OUTPATIENT)
Dept: OBSTETRICS AND GYNECOLOGY | Facility: CLINIC | Age: 60
End: 2025-06-09
Payer: COMMERCIAL

## 2025-06-09 ENCOUNTER — CLINICAL SUPPORT (OUTPATIENT)
Dept: ENDOCRINOLOGY | Facility: CLINIC | Age: 60
End: 2025-06-09
Payer: COMMERCIAL

## 2025-06-09 DIAGNOSIS — E11.65 TYPE 2 DIABETES MELLITUS WITH HYPERGLYCEMIA, WITHOUT LONG-TERM CURRENT USE OF INSULIN: ICD-10-CM

## 2025-06-09 LAB — POC HEMOGLOBIN A1C: 5.8 % (ref 4.2–6.5)

## 2025-06-09 PROCEDURE — 95251 CONT GLUC MNTR ANALYSIS I&R: CPT

## 2025-06-09 PROCEDURE — 99211 OFF/OP EST MAY X REQ PHY/QHP: CPT

## 2025-06-09 PROCEDURE — 83036 HEMOGLOBIN GLYCOSYLATED A1C: CPT

## 2025-06-09 RX ORDER — TIRZEPATIDE 7.5 MG/.5ML
7.5 INJECTION, SOLUTION SUBCUTANEOUS
Qty: 2 ML | Refills: 11 | Status: SHIPPED | OUTPATIENT
Start: 2025-06-09

## 2025-06-09 NOTE — PATIENT INSTRUCTIONS
Increase Mounjaro to 7.5mg once weekly, can finish home supply  Continue rosuvastatin 10mg once daily, please try to take this every day  Stop at the lab to recheck cholesterol once you have been taking rosuvastatin consistently for 1-2 months, don't eat for 8-12 hours before getting cholesterol  If Elizabeth sensor falls off early, please call the company and request a replacement: [phone]  If you have any questions or concerns, call me at: [phone]

## 2025-06-27 ENCOUNTER — TELEPHONE (OUTPATIENT)
Dept: ENDOCRINOLOGY | Facility: CLINIC | Age: 60
End: 2025-06-27
Payer: COMMERCIAL

## 2025-06-27 NOTE — TELEPHONE ENCOUNTER
appt reminder request patient arrive 10-15 minutes early for the appt. Patient states that she only uses [facility] every 2 weeks. Patient made aware that the last upload information will be added to her chart for the appt.

## 2025-06-30 ENCOUNTER — APPOINTMENT (OUTPATIENT)
Dept: ENDOCRINOLOGY | Facility: CLINIC | Age: 60
End: 2025-06-30
Payer: COMMERCIAL

## 2025-06-30 VITALS
DIASTOLIC BLOOD PRESSURE: 83 MMHG | SYSTOLIC BLOOD PRESSURE: 129 MMHG | TEMPERATURE: 97 F | HEART RATE: 82 BPM | HEIGHT: 64 IN | WEIGHT: 176.1 LBS | BODY MASS INDEX: 30.07 KG/M2

## 2025-06-30 DIAGNOSIS — E78.00 HYPERCHOLESTEROLEMIA: ICD-10-CM

## 2025-06-30 DIAGNOSIS — E11.65 TYPE 2 DIABETES MELLITUS WITH HYPERGLYCEMIA, WITHOUT LONG-TERM CURRENT USE OF INSULIN: Primary | ICD-10-CM

## 2025-06-30 DIAGNOSIS — E11.65 TYPE 2 DIABETES MELLITUS WITH HYPERGLYCEMIA, WITHOUT LONG-TERM CURRENT USE OF INSULIN: ICD-10-CM

## 2025-06-30 DIAGNOSIS — E66.811 OBESITY (BMI 30.0-34.9): ICD-10-CM

## 2025-06-30 PROCEDURE — 95251 CONT GLUC MNTR ANALYSIS I&R: CPT | Performed by: NURSE PRACTITIONER

## 2025-06-30 PROCEDURE — 3008F BODY MASS INDEX DOCD: CPT | Performed by: NURSE PRACTITIONER

## 2025-06-30 PROCEDURE — 3044F HG A1C LEVEL LT 7.0%: CPT | Performed by: NURSE PRACTITIONER

## 2025-06-30 PROCEDURE — 99214 OFFICE O/P EST MOD 30 MIN: CPT | Performed by: NURSE PRACTITIONER

## 2025-06-30 PROCEDURE — 3074F SYST BP LT 130 MM HG: CPT | Performed by: NURSE PRACTITIONER

## 2025-06-30 PROCEDURE — 1036F TOBACCO NON-USER: CPT | Performed by: NURSE PRACTITIONER

## 2025-06-30 PROCEDURE — 3079F DIAST BP 80-89 MM HG: CPT | Performed by: NURSE PRACTITIONER

## 2025-06-30 ASSESSMENT — PATIENT HEALTH QUESTIONNAIRE - PHQ9
1. LITTLE INTEREST OR PLEASURE IN DOING THINGS: NOT AT ALL
2. FEELING DOWN, DEPRESSED OR HOPELESS: NOT AT ALL
SUM OF ALL RESPONSES TO PHQ9 QUESTIONS 1 AND 2: 0

## 2025-06-30 ASSESSMENT — ENCOUNTER SYMPTOMS
LOSS OF SENSATION IN FEET: 0
DEPRESSION: 0
OCCASIONAL FEELINGS OF UNSTEADINESS: 0

## 2025-06-30 ASSESSMENT — PAIN SCALES - GENERAL: PAINLEVEL_OUTOF10: 0-NO PAIN

## 2025-06-30 NOTE — PROGRESS NOTES
"Roldan Emery is a 60 y.o. female presents today for a follow up of DM Type 2.  Initial diagnosis with diabetes was \"some years ago.\" The patient has a family history is her mother, maternal grandmother, sister.   Known complications include: HTN,   History of gastric sleeve and \"I did not do what I was supposed to do and the weight came back.\"       Last visit with me 1/2025  He was able to meet with PharmD in the past  A1c 5.8% in 6/2025 and previously 8.2% in 1/2025.      Since last visit, she is doing well on Mounjaro   Has lost weight though she would like to lose more  Previous weigh 191 lbs.   Today weight: 176 lbs      Historical meds:   Basaglar - stopped when sugars at goal   Metformin 1000 mg twice daily - GI side effect, A1c controlled with GLP1  Mounjaro 5 mg- controlled but stopped due to lapse in insurance      Current diabetes regimen is as follows:   Mounjaro 5 mg once weekly - will start 7.5 mg once weekly     Patient is not using continuous glucose monitor - [facility] 3  The patient is not currently checking the blood glucose as needed     Hypoglycemia frequency: Denies   Hypoglycemia awareness: yes     The patient comes into the office today with wanting to lose more weight.       ROS  General: no fever, chills.  Weight is down. See HPI   Skin: no rashes, pruritis or dry skin  Cardiac: denies chest pain, heart palpitations or orthopnea  Pulmonary: denies wheezing, productive cough or exertional dyspnea      Objective    Physical Exam  Blood pressure 129/83, pulse 82, temperature 36.1 °C (97 °F), temperature source Temporal, height 1.626 m (5' 4\"), weight 79.9 kg (176 lb 1.6 oz), last menstrual period 03/15/2021.  General: not in acute distress, cooperative   Respiratory: normal respiratory effort  Musculoskeletal: normal gait       Medications Ordered Prior to Encounter[1]       The 10-year ASCVD risk score (Consuelo CORDOVA, et al., 2019) is: 16.1%    Values used to calculate the score:      " Age: 60 years      Sex: Female      Is Non- : Yes      Diabetic: Yes      Tobacco smoker: No      Systolic Blood Pressure: 129 mmHg      Is BP treated: No      HDL Cholesterol: 47 mg/dL      Total Cholesterol: 271 mg/dL      Assessment/Plan   Type 2 diabetes with hyperglycemia without long term use insulin:   Obesity, BMI 30  Hyperlipidemia:  - A1c at goal.  She is back on Mounjaro and tolerating well.  She would like to lose more weight.  BMI has improved from last visit.  Discussed next goal is less than BMI 30.  Discussed weight of 145 lbs would get her out of overweight category.  Discussed 5-10% weight loss goal to start.  Discussed increasing the mounjaro is ok however she must be eating enough and spreading the meals out during the day.  LDL previously not at goal.  Started statin but was not taking routinely.  She reports taking more routinely now.  Reinforced ASCVD risk is elevated.  Recommend repeating labs end of Aug/beginning of July       Plan:   Use up Mounjaro 5 mg dose and then switch to 7.5 mg once weekly   Remember to spread out meals, small meals throughout day    Continue the [facility]   Call to get the voucher for $75  Make sure rosuvastatin 10 mg once daily   Get lipid panel updated in end of Aug/beginning of Sept    Follow up 6-9 months          [1]   Current Outpatient Medications on File Prior to Visit   Medication Sig Dispense Refill    blood-glucose sensor (FreeStyle [facility] 3 Plus Sensor) device Change every 15 days 6 each 3    rosuvastatin (Crestor) 10 mg tablet Take 1 tablet (10 mg) by mouth once daily. 90 tablet 3    tirzepatide (Mounjaro) 7.5 mg/0.5 mL pen injector Inject 7.5 mg under the skin every 7 days. 2 mL 11     Current Facility-Administered Medications on File Prior to Visit   Medication Dose Route Frequency Provider Last Rate Last Admin    [DISCONTINUED] insulin glargine (Lantus) pen 20 Units  20 Units subcutaneous q AM Ashley Larose MD

## 2025-06-30 NOTE — PATIENT INSTRUCTIONS
Use up Mounjaro 5 mg dose and then switch to 7.5 mg once weekly   Remember to spread out meals, small meals throughout day      Continue the [facility]   Call to get the voucher for $75    Make sure rosuvastatin 10 mg once daily   Get lipid panel updated in end of Aug/beginning of Sept      Follow up 6-9 months

## 2025-07-02 ENCOUNTER — HOSPITAL ENCOUNTER (OUTPATIENT)
Dept: RADIOLOGY | Facility: CLINIC | Age: 60
Discharge: HOME | End: 2025-07-02
Payer: COMMERCIAL

## 2025-07-02 ENCOUNTER — OFFICE VISIT (OUTPATIENT)
Dept: PRIMARY CARE | Facility: CLINIC | Age: 60
End: 2025-07-02
Payer: COMMERCIAL

## 2025-07-02 ENCOUNTER — APPOINTMENT (OUTPATIENT)
Dept: PRIMARY CARE | Facility: CLINIC | Age: 60
End: 2025-07-02
Payer: COMMERCIAL

## 2025-07-02 VITALS — HEIGHT: 64 IN | WEIGHT: 176.15 LBS | BODY MASS INDEX: 30.07 KG/M2

## 2025-07-02 DIAGNOSIS — Z12.11 COLON CANCER SCREENING: ICD-10-CM

## 2025-07-02 DIAGNOSIS — Z12.31 BREAST CANCER SCREENING BY MAMMOGRAM: ICD-10-CM

## 2025-07-02 DIAGNOSIS — Z23 NEED FOR VACCINATION: ICD-10-CM

## 2025-07-02 DIAGNOSIS — D64.9 ANEMIA, UNSPECIFIED TYPE: ICD-10-CM

## 2025-07-02 DIAGNOSIS — Z12.31 BREAST CANCER SCREENING BY MAMMOGRAM: Primary | ICD-10-CM

## 2025-07-02 PROCEDURE — 90471 IMMUNIZATION ADMIN: CPT | Performed by: PHYSICIAN ASSISTANT

## 2025-07-02 PROCEDURE — 77067 SCR MAMMO BI INCL CAD: CPT | Performed by: RADIOLOGY

## 2025-07-02 PROCEDURE — 3044F HG A1C LEVEL LT 7.0%: CPT | Performed by: NURSE PRACTITIONER

## 2025-07-02 PROCEDURE — 77063 BREAST TOMOSYNTHESIS BI: CPT | Performed by: RADIOLOGY

## 2025-07-02 PROCEDURE — 1036F TOBACCO NON-USER: CPT | Performed by: PHYSICIAN ASSISTANT

## 2025-07-02 PROCEDURE — 1036F TOBACCO NON-USER: CPT | Performed by: NURSE PRACTITIONER

## 2025-07-02 PROCEDURE — 77067 SCR MAMMO BI INCL CAD: CPT

## 2025-07-02 PROCEDURE — 90715 TDAP VACCINE 7 YRS/> IM: CPT | Performed by: PHYSICIAN ASSISTANT

## 2025-07-02 PROCEDURE — 99213 OFFICE O/P EST LOW 20 MIN: CPT | Performed by: NURSE PRACTITIONER

## 2025-07-02 PROCEDURE — 3044F HG A1C LEVEL LT 7.0%: CPT | Performed by: PHYSICIAN ASSISTANT

## 2025-07-02 PROCEDURE — 90472 IMMUNIZATION ADMIN EACH ADD: CPT | Performed by: PHYSICIAN ASSISTANT

## 2025-07-02 PROCEDURE — 90750 HZV VACC RECOMBINANT IM: CPT | Performed by: PHYSICIAN ASSISTANT

## 2025-07-02 NOTE — PROGRESS NOTES
An interactive audio and video telecommunication system which permits real time communications between the patient (at the originating site) and provider (at the distant site) was utilized to provide this telehealth service.    Verbal consent was requested and obtained from Rere Emery  on this date, 07/02/25  , for a telehealth visit.      Subjective   Patient ID: Rere Emery is a 60 y.o. female who presenting virtually to establish care. She is doing well, no major concerns.     HPI   DMT2: followed by pee Bolaños CNP. Doing well on Mounjaro 5 mg weekly. Last A1c 5.8% 06/2025  Hyperlipidemia: on rosuvastatin 10 mg every day  GYN: followed by gyn. Pap UTD 08/2023. +AVB - endometrial biopsy benign. If no resolution they will consider hysteroscopic evaluation.    SOCIAL: Living at home with . 4 adult children. 1 granddaughter 2 years old.   Exercise: none   Alcohol use: none   Tobacco use: none   Review of Systems    Objective   LMP 03/15/2021     Physical Exam  Constitutional:       Appearance: Normal appearance.   Neurological:      General: No focal deficit present.      Mental Status: She is alert and oriented to person, place, and time.   Psychiatric:         Mood and Affect: Mood normal.         Behavior: Behavior normal.         Assessment/Plan   Diagnoses and all orders for this visit:  Breast cancer screening by mammogram  -     BI mammo bilateral screening tomosynthesis; Future  Anemia, unspecified type  -     CBC; Future  Colon cancer screening  -     Colonoscopy Screening; Average Risk Patient; Future     Health maintenance: mammogram due, ordered. Colonoscopy due, ordered. Pap UTD per GYN 08/2023.   Due for shingrix tdap - coming in tomorrow

## 2025-07-08 DIAGNOSIS — Z12.11 SPECIAL SCREENING FOR MALIGNANT NEOPLASMS, COLON: ICD-10-CM

## 2025-07-08 RX ORDER — POLYETHYLENE GLYCOL 3350, SODIUM SULFATE ANHYDROUS, SODIUM BICARBONATE, SODIUM CHLORIDE, POTASSIUM CHLORIDE 236; 22.74; 6.74; 5.86; 2.97 G/4L; G/4L; G/4L; G/4L; G/4L
4 POWDER, FOR SOLUTION ORAL ONCE
Qty: 4000 ML | Refills: 0 | Status: SHIPPED | OUTPATIENT
Start: 2025-07-08 | End: 2025-07-08

## 2025-07-21 ENCOUNTER — APPOINTMENT (OUTPATIENT)
Dept: OBSTETRICS AND GYNECOLOGY | Facility: CLINIC | Age: 60
End: 2025-07-21
Payer: COMMERCIAL

## 2025-07-21 VITALS
DIASTOLIC BLOOD PRESSURE: 74 MMHG | SYSTOLIC BLOOD PRESSURE: 144 MMHG | HEIGHT: 64 IN | BODY MASS INDEX: 29.37 KG/M2 | WEIGHT: 172 LBS

## 2025-07-21 DIAGNOSIS — Z01.419 WOMEN'S ANNUAL ROUTINE GYNECOLOGICAL EXAMINATION: Primary | ICD-10-CM

## 2025-07-21 PROCEDURE — 3044F HG A1C LEVEL LT 7.0%: CPT | Performed by: OBSTETRICS & GYNECOLOGY

## 2025-07-21 PROCEDURE — 99396 PREV VISIT EST AGE 40-64: CPT | Performed by: OBSTETRICS & GYNECOLOGY

## 2025-07-21 PROCEDURE — 3078F DIAST BP <80 MM HG: CPT | Performed by: OBSTETRICS & GYNECOLOGY

## 2025-07-21 PROCEDURE — 3077F SYST BP >= 140 MM HG: CPT | Performed by: OBSTETRICS & GYNECOLOGY

## 2025-07-21 PROCEDURE — 3008F BODY MASS INDEX DOCD: CPT | Performed by: OBSTETRICS & GYNECOLOGY

## 2025-07-21 PROCEDURE — 1036F TOBACCO NON-USER: CPT | Performed by: OBSTETRICS & GYNECOLOGY

## 2025-07-21 SDOH — ECONOMIC STABILITY: INCOME INSECURITY: IN THE LAST 12 MONTHS, WAS THERE A TIME WHEN YOU WERE NOT ABLE TO PAY THE MORTGAGE OR RENT ON TIME?: NO

## 2025-07-21 SDOH — ECONOMIC STABILITY: FOOD INSECURITY: WITHIN THE PAST 12 MONTHS, THE FOOD YOU BOUGHT JUST DIDN'T LAST AND YOU DIDN'T HAVE MONEY TO GET MORE.: NEVER TRUE

## 2025-07-21 SDOH — ECONOMIC STABILITY: FOOD INSECURITY: WITHIN THE PAST 12 MONTHS, YOU WORRIED THAT YOUR FOOD WOULD RUN OUT BEFORE YOU GOT MONEY TO BUY MORE.: NEVER TRUE

## 2025-07-21 SDOH — ECONOMIC STABILITY: TRANSPORTATION INSECURITY
IN THE PAST 12 MONTHS, HAS LACK OF TRANSPORTATION KEPT YOU FROM MEETINGS, WORK, OR FROM GETTING THINGS NEEDED FOR DAILY LIVING?: NO

## 2025-07-21 SDOH — ECONOMIC STABILITY: TRANSPORTATION INSECURITY
IN THE PAST 12 MONTHS, HAS THE LACK OF TRANSPORTATION KEPT YOU FROM MEDICAL APPOINTMENTS OR FROM GETTING MEDICATIONS?: NO

## 2025-07-21 ASSESSMENT — PATIENT HEALTH QUESTIONNAIRE - PHQ9
SUM OF ALL RESPONSES TO PHQ9 QUESTIONS 1 AND 2: 0
1. LITTLE INTEREST OR PLEASURE IN DOING THINGS: NOT AT ALL
2. FEELING DOWN, DEPRESSED OR HOPELESS: NOT AT ALL

## 2025-07-21 ASSESSMENT — COLUMBIA-SUICIDE SEVERITY RATING SCALE - C-SSRS
2. HAVE YOU ACTUALLY HAD ANY THOUGHTS OF KILLING YOURSELF?: NO
1. IN THE PAST MONTH, HAVE YOU WISHED YOU WERE DEAD OR WISHED YOU COULD GO TO SLEEP AND NOT WAKE UP?: NO
6. HAVE YOU EVER DONE ANYTHING, STARTED TO DO ANYTHING, OR PREPARED TO DO ANYTHING TO END YOUR LIFE?: NO

## 2025-07-21 NOTE — PROGRESS NOTES
"Rere Emery is a 60 y.o. female who is here for a routine exam. PCP = Ruma Edgar, APRN-CNP  Patient here for annual exam no complaints.  Has steady partner but they are not currently sexually active due to some medical issues.    Review of Systems  Negative    Physical Exam  Constitutional:       Appearance: Normal appearance.   Genitourinary:      Genitourinary Comments: External genitalia unremarkable  Vagina clear  Cervix closed uterus normal anteverted seems fixed anteriorly  Adnexa masses or tenderness  Perineum without lesions or swelling    Breast without masses tenderness or nipple discharge, normal appearance   Breasts:     Breasts are soft.     Right: Normal.      Left: Normal.   HENT:      Head: Normocephalic.      Nose: Nose normal.     Eyes:      Pupils: Pupils are equal, round, and reactive to light.       Cardiovascular:      Rate and Rhythm: Normal rate and regular rhythm.   Pulmonary:      Effort: Pulmonary effort is normal.      Breath sounds: Normal breath sounds.   Abdominal:      General: Abdomen is flat. Bowel sounds are normal.      Palpations: Abdomen is soft.     Musculoskeletal:         General: Normal range of motion.      Cervical back: Normal range of motion and neck supple.     Neurological:      General: No focal deficit present.      Mental Status: She is alert.     Skin:     General: Skin is warm and dry.     Psychiatric:         Mood and Affect: Mood normal.         Behavior: Behavior normal.         Thought Content: Thought content normal.         Judgment: Judgment normal.   Vitals reviewed.         Objective   /74   Ht 1.626 m (5' 4\")   Wt 78 kg (172 lb)   LMP 03/15/2021   BMI 29.52 kg/m²   OB History          6    Para   4    Term   1            AB   2    Living   3         SAB   1    IAB   1    Ectopic        Multiple        Live Births   4                  GynHx:  Menarche/Menopause:     Social History     Substance and Sexual Activity "   Sexual Activity Not Currently    Partners: Male    Birth control/protection: Abstinence, None     STIs: none    Substance:   Tobacco Use: Low Risk  (7/21/2025)    Patient History     Smoking Tobacco Use: Never     Smokeless Tobacco Use: Never     Passive Exposure: Never      Social History     Substance and Sexual Activity   Drug Use Never      Social History     Substance and Sexual Activity   Alcohol Use Not Currently     Abuse: No  Depression Screen:   Negative    Past med hx and past surg hx reviewedand notable for: Had history of postmenopausal spotting.  Negative endometrial biopsy.  Ultrasound also negative no further history    Assessment/Plan      Unremarkable annual GYN exam.  Patient has steady partner but are not currently sexually active.  Declined STD testing.  Discussed diet exercise calcium and vitamin D.  Has mammogram done 2 weeks ago results pending.  Return to office in 1 year or as needed

## 2025-08-18 ENCOUNTER — APPOINTMENT (OUTPATIENT)
Dept: GASTROENTEROLOGY | Facility: EXTERNAL LOCATION | Age: 60
End: 2025-08-18
Payer: COMMERCIAL

## 2025-08-18 DIAGNOSIS — Z12.11 COLON CANCER SCREENING: Primary | ICD-10-CM

## 2025-08-18 DIAGNOSIS — D12.5 BENIGN NEOPLASM OF SIGMOID COLON: ICD-10-CM

## 2025-08-18 DIAGNOSIS — D12.2 BENIGN NEOPLASM OF ASCENDING COLON: ICD-10-CM

## 2025-08-18 PROCEDURE — 3044F HG A1C LEVEL LT 7.0%: CPT | Performed by: INTERNAL MEDICINE

## 2025-08-18 PROCEDURE — 45380 COLONOSCOPY AND BIOPSY: CPT | Performed by: INTERNAL MEDICINE

## 2025-08-18 PROCEDURE — 45385 COLONOSCOPY W/LESION REMOVAL: CPT | Performed by: INTERNAL MEDICINE

## 2025-11-03 ENCOUNTER — APPOINTMENT (OUTPATIENT)
Dept: PRIMARY CARE | Facility: CLINIC | Age: 60
End: 2025-11-03
Payer: COMMERCIAL

## 2025-12-18 ENCOUNTER — APPOINTMENT (OUTPATIENT)
Dept: ENDOCRINOLOGY | Facility: CLINIC | Age: 60
End: 2025-12-18
Payer: COMMERCIAL